# Patient Record
Sex: MALE | Race: WHITE | Employment: OTHER | URBAN - METROPOLITAN AREA
[De-identification: names, ages, dates, MRNs, and addresses within clinical notes are randomized per-mention and may not be internally consistent; named-entity substitution may affect disease eponyms.]

---

## 2017-05-12 ENCOUNTER — OFFICE VISIT (OUTPATIENT)
Dept: ORTHOPEDIC SURGERY | Age: 82
End: 2017-05-12

## 2017-05-12 VITALS — BODY MASS INDEX: 21.13 KG/M2 | HEIGHT: 72 IN | WEIGHT: 156 LBS | RESPIRATION RATE: 14 BRPM

## 2017-05-12 DIAGNOSIS — M18.12 PRIMARY OSTEOARTHRITIS OF FIRST CARPOMETACARPAL JOINT OF LEFT HAND: ICD-10-CM

## 2017-05-12 DIAGNOSIS — L03.119 CELLULITIS OF HAND: Primary | ICD-10-CM

## 2017-05-12 PROCEDURE — G8427 DOCREV CUR MEDS BY ELIG CLIN: HCPCS | Performed by: ORTHOPAEDIC SURGERY

## 2017-05-12 PROCEDURE — 4040F PNEUMOC VAC/ADMIN/RCVD: CPT | Performed by: ORTHOPAEDIC SURGERY

## 2017-05-12 PROCEDURE — G8419 CALC BMI OUT NRM PARAM NOF/U: HCPCS | Performed by: ORTHOPAEDIC SURGERY

## 2017-05-12 PROCEDURE — 1036F TOBACCO NON-USER: CPT | Performed by: ORTHOPAEDIC SURGERY

## 2017-05-12 PROCEDURE — 99213 OFFICE O/P EST LOW 20 MIN: CPT | Performed by: ORTHOPAEDIC SURGERY

## 2017-05-12 PROCEDURE — 1123F ACP DISCUSS/DSCN MKR DOCD: CPT | Performed by: ORTHOPAEDIC SURGERY

## 2017-05-12 RX ORDER — CLINDAMYCIN HYDROCHLORIDE 300 MG/1
300 CAPSULE ORAL 4 TIMES DAILY
Qty: 40 CAPSULE | Refills: 0 | Status: SHIPPED | OUTPATIENT
Start: 2017-05-12 | End: 2017-05-22

## 2017-05-12 RX ORDER — NAPROXEN 500 MG/1
500 TABLET ORAL 2 TIMES DAILY WITH MEALS
Qty: 60 TABLET | Refills: 3 | Status: SHIPPED | OUTPATIENT
Start: 2017-05-12 | End: 2019-02-25

## 2017-05-17 ENCOUNTER — OFFICE VISIT (OUTPATIENT)
Dept: ORTHOPEDIC SURGERY | Age: 82
End: 2017-05-17

## 2017-05-17 VITALS
RESPIRATION RATE: 16 BRPM | HEART RATE: 59 BPM | HEIGHT: 72 IN | SYSTOLIC BLOOD PRESSURE: 135 MMHG | WEIGHT: 156 LBS | BODY MASS INDEX: 21.13 KG/M2 | DIASTOLIC BLOOD PRESSURE: 78 MMHG

## 2017-05-17 DIAGNOSIS — M18.12 PRIMARY OSTEOARTHRITIS OF FIRST CARPOMETACARPAL JOINT OF LEFT HAND: Primary | ICD-10-CM

## 2017-05-17 PROCEDURE — 4040F PNEUMOC VAC/ADMIN/RCVD: CPT | Performed by: ORTHOPAEDIC SURGERY

## 2017-05-17 PROCEDURE — G8427 DOCREV CUR MEDS BY ELIG CLIN: HCPCS | Performed by: ORTHOPAEDIC SURGERY

## 2017-05-17 PROCEDURE — G8419 CALC BMI OUT NRM PARAM NOF/U: HCPCS | Performed by: ORTHOPAEDIC SURGERY

## 2017-05-17 PROCEDURE — 1036F TOBACCO NON-USER: CPT | Performed by: ORTHOPAEDIC SURGERY

## 2017-05-17 PROCEDURE — 99213 OFFICE O/P EST LOW 20 MIN: CPT | Performed by: ORTHOPAEDIC SURGERY

## 2017-05-17 PROCEDURE — 1123F ACP DISCUSS/DSCN MKR DOCD: CPT | Performed by: ORTHOPAEDIC SURGERY

## 2018-10-13 ENCOUNTER — HOSPITAL ENCOUNTER (EMERGENCY)
Age: 83
Discharge: HOME | End: 2018-10-13
Attending: EMERGENCY MEDICINE
Payer: MEDICARE

## 2018-10-13 ENCOUNTER — APPOINTMENT (OUTPATIENT)
Dept: CT IMAGING | Age: 83
End: 2018-10-13
Payer: MEDICARE

## 2018-10-13 VITALS
OXYGEN SATURATION: 98 % | SYSTOLIC BLOOD PRESSURE: 123 MMHG | RESPIRATION RATE: 16 BRPM | DIASTOLIC BLOOD PRESSURE: 73 MMHG | WEIGHT: 160.6 LBS | TEMPERATURE: 97.7 F | HEART RATE: 63 BPM | BODY MASS INDEX: 21.78 KG/M2

## 2018-10-13 DIAGNOSIS — M26.621 ARTHRALGIA OF RIGHT TEMPOROMANDIBULAR JOINT: ICD-10-CM

## 2018-10-13 DIAGNOSIS — H66.90 ACUTE OTITIS MEDIA, UNSPECIFIED OTITIS MEDIA TYPE: ICD-10-CM

## 2018-10-13 DIAGNOSIS — R42 VERTIGO: Primary | ICD-10-CM

## 2018-10-13 LAB
A/G RATIO: 1.5 (ref 1.1–2.2)
ALBUMIN SERPL-MCNC: 4 G/DL (ref 3.4–5)
ALP BLD-CCNC: 42 U/L (ref 40–129)
ALT SERPL-CCNC: 9 U/L (ref 10–40)
ANION GAP SERPL CALCULATED.3IONS-SCNC: 12 MMOL/L (ref 3–16)
AST SERPL-CCNC: 19 U/L (ref 15–37)
BASOPHILS ABSOLUTE: 0 K/UL (ref 0–0.2)
BASOPHILS RELATIVE PERCENT: 0.4 %
BILIRUB SERPL-MCNC: 0.6 MG/DL (ref 0–1)
BILIRUBIN URINE: NEGATIVE
BLOOD, URINE: NEGATIVE
BUN BLDV-MCNC: 19 MG/DL (ref 7–20)
CALCIUM SERPL-MCNC: 8.8 MG/DL (ref 8.3–10.6)
CHLORIDE BLD-SCNC: 95 MMOL/L (ref 99–110)
CLARITY: CLEAR
CO2: 27 MMOL/L (ref 21–32)
COLOR: YELLOW
CREAT SERPL-MCNC: 0.8 MG/DL (ref 0.8–1.3)
EOSINOPHILS ABSOLUTE: 0.2 K/UL (ref 0–0.6)
EOSINOPHILS RELATIVE PERCENT: 3.5 %
GFR AFRICAN AMERICAN: >60
GFR NON-AFRICAN AMERICAN: >60
GLOBULIN: 2.6 G/DL
GLUCOSE BLD-MCNC: 101 MG/DL (ref 70–99)
GLUCOSE URINE: NEGATIVE MG/DL
HCT VFR BLD CALC: 38.3 % (ref 40.5–52.5)
HEMOGLOBIN: 13.1 G/DL (ref 13.5–17.5)
KETONES, URINE: ABNORMAL MG/DL
LEUKOCYTE ESTERASE, URINE: NEGATIVE
LYMPHOCYTES ABSOLUTE: 1 K/UL (ref 1–5.1)
LYMPHOCYTES RELATIVE PERCENT: 15.1 %
MCH RBC QN AUTO: 30.8 PG (ref 26–34)
MCHC RBC AUTO-ENTMCNC: 34.2 G/DL (ref 31–36)
MCV RBC AUTO: 90.1 FL (ref 80–100)
MICROSCOPIC EXAMINATION: ABNORMAL
MONOCYTES ABSOLUTE: 0.5 K/UL (ref 0–1.3)
MONOCYTES RELATIVE PERCENT: 8.2 %
NEUTROPHILS ABSOLUTE: 4.8 K/UL (ref 1.7–7.7)
NEUTROPHILS RELATIVE PERCENT: 72.8 %
NITRITE, URINE: NEGATIVE
PDW BLD-RTO: 13.5 % (ref 12.4–15.4)
PH UA: 6
PLATELET # BLD: 181 K/UL (ref 135–450)
PMV BLD AUTO: 7.4 FL (ref 5–10.5)
POTASSIUM SERPL-SCNC: 3.4 MMOL/L (ref 3.5–5.1)
PROTEIN UA: NEGATIVE MG/DL
RBC # BLD: 4.26 M/UL (ref 4.2–5.9)
SODIUM BLD-SCNC: 134 MMOL/L (ref 136–145)
SPECIFIC GRAVITY UA: 1.02
TOTAL PROTEIN: 6.6 G/DL (ref 6.4–8.2)
TROPONIN: <0.01 NG/ML
URINE REFLEX TO CULTURE: ABNORMAL
URINE TYPE: ABNORMAL
UROBILINOGEN, URINE: 1 E.U./DL
WBC # BLD: 6.6 K/UL (ref 4–11)

## 2018-10-13 PROCEDURE — 80053 COMPREHEN METABOLIC PANEL: CPT

## 2018-10-13 PROCEDURE — 84484 ASSAY OF TROPONIN QUANT: CPT

## 2018-10-13 PROCEDURE — 6370000000 HC RX 637 (ALT 250 FOR IP): Performed by: NURSE PRACTITIONER

## 2018-10-13 PROCEDURE — 81003 URINALYSIS AUTO W/O SCOPE: CPT

## 2018-10-13 PROCEDURE — 99284 EMERGENCY DEPT VISIT MOD MDM: CPT

## 2018-10-13 PROCEDURE — 93005 ELECTROCARDIOGRAM TRACING: CPT | Performed by: NURSE PRACTITIONER

## 2018-10-13 PROCEDURE — 70450 CT HEAD/BRAIN W/O DYE: CPT

## 2018-10-13 PROCEDURE — 36415 COLL VENOUS BLD VENIPUNCTURE: CPT

## 2018-10-13 PROCEDURE — 85025 COMPLETE CBC W/AUTO DIFF WBC: CPT

## 2018-10-13 RX ORDER — MECLIZINE HCL 12.5 MG/1
50 TABLET ORAL ONCE
Status: COMPLETED | OUTPATIENT
Start: 2018-10-13 | End: 2018-10-13

## 2018-10-13 RX ORDER — MECLIZINE HYDROCHLORIDE 25 MG/1
25 TABLET ORAL 3 TIMES DAILY PRN
Qty: 30 TABLET | Refills: 1 | Status: SHIPPED | OUTPATIENT
Start: 2018-10-13 | End: 2018-10-23

## 2018-10-13 RX ORDER — METHYLPREDNISOLONE 4 MG/1
TABLET ORAL
Qty: 1 KIT | Refills: 0 | Status: SHIPPED | OUTPATIENT
Start: 2018-10-13 | End: 2019-02-25

## 2018-10-13 RX ADMIN — MECLIZINE 50 MG: 12.5 TABLET ORAL at 21:22

## 2018-10-13 ASSESSMENT — ENCOUNTER SYMPTOMS
ABDOMINAL PAIN: 0
COLOR CHANGE: 0
SINUS PAIN: 0
SHORTNESS OF BREATH: 0
RHINORRHEA: 0
NAUSEA: 0
VOMITING: 0

## 2018-10-14 PROCEDURE — 93010 ELECTROCARDIOGRAM REPORT: CPT | Performed by: INTERNAL MEDICINE

## 2018-10-14 NOTE — ED PROVIDER NOTES
vitals reviewed. EKG visualized interpreted by myself shows sinus rhythm. There is a first-degree AV block. The axis is to the left at -53 consistent with a left anterior fascicular block. Otherwise ST-T waves intervals unremarkable without and evidence of acute injury or ischemic pattern. 1. Vertigo    2. Acute otitis media, unspecified otitis media type    3. Arthralgia of right temporomandibular joint          DISPOSITION/PLAN  PATIENT REFERRED TO:  Harika Resendez MD  [address] [facility]   [address] [facility] [address]  [phone]    Schedule an appointment as soon as possible for a visit       [address] [facility] Emergency Department  [id number]  [phone]  Go to   As needed    DISCHARGE MEDICATIONS:  Discharge Medication List as of 10/13/2018 11:44 PM      START taking these medications    Details   meclizine (ANTIVERT) 25 MG tablet Take 1 tablet by mouth 3 times daily as needed for Dizziness, Disp-30 tablet, R-1Print      methylPREDNISolone (MEDROL, TAMMY,) 4 MG tablet By mouth., Disp-1 kit, R-0Print               MD Alysha Juan MD  10/14/18 [id number]
and nontender. Lungs CTA bilaterally. Cardiac exam is normal.  Neck supple with full range of motion. Patient has otitis media of the right ear as well as possibly some TMJ syndrome with tenderness over the right TMJ. Patient is neurovascularly intact without deficits. Patient is awake, alert & oriented x4 and speaking in complete sentences without dysphasia or slurring of their words, CN II-XII grossly intact, good coordination with normal finger-to-nose and heel-to-shin test , 5/5 motor strength in all 4 extremities, sensation to light touch intact bilaterally, patellar and achilles reflexes 2+ and equal bilaterally, gait is normal without ataxia, no truncal ataxia. Urine shows no infection or blood. No leukocytosis or anemia. No gross electrolyte abnormality other kidney or liver dysfunction. Troponin is negative. CT head normal.  EKG normal.    Emergency department course included Antivert. On my reassessment patient was standing at the side of the bed requesting be discharged. He states he had absolutely no dizziness or lightheadedness. This could've been vertigo or dehydration. This could be a secondary effect from the otitis media or TMJ. He was given a prescription for prednisone for the TMJ and Antivert for the dizziness. I feel he is appropriate and safe discharge home this time. Patient has a normal repeat neurological exam. At this time there is no indication of head injury, encephalopathy, multiple sclerosis, TIA/CVA, seizures, dehydration, hypoglycemia, mass lesions, metabolic cause, cardiac arrhythmia, PE, GI bleeding or orthostatic hypotension. Patient is stable throughout ED course and safe for outpatient follow-up. Patient made aware of treatment plan and verbally understood and agreed. Patient stable for outpatient follow-up with their family doctor in 24-48 hours.   At this time, the evidence for any other entities in the differential is insufficient to justify any further

## 2018-10-16 LAB
EKG ATRIAL RATE: 60 BPM
EKG DIAGNOSIS: NORMAL
EKG P AXIS: 76 DEGREES
EKG P-R INTERVAL: 242 MS
EKG Q-T INTERVAL: 456 MS
EKG QRS DURATION: 94 MS
EKG QTC CALCULATION (BAZETT): 456 MS
EKG R AXIS: -53 DEGREES
EKG T AXIS: -3 DEGREES
EKG VENTRICULAR RATE: 60 BPM

## 2019-02-25 RX ORDER — FINASTERIDE 5 MG/1
5 TABLET, FILM COATED ORAL DAILY
COMMUNITY
Start: 2018-12-06

## 2019-02-25 RX ORDER — PRAVASTATIN SODIUM 20 MG
20 TABLET ORAL DAILY
COMMUNITY
Start: 2018-12-31

## 2019-02-25 RX ORDER — CARVEDILOL 12.5 MG/1
12.5 TABLET ORAL 2 TIMES DAILY WITH MEALS
COMMUNITY
Start: 2019-02-11

## 2019-02-28 ENCOUNTER — ANESTHESIA EVENT (OUTPATIENT)
Dept: ENDOSCOPY | Age: 84
End: 2019-02-28
Payer: MEDICARE

## 2019-03-05 ENCOUNTER — HOSPITAL ENCOUNTER (OUTPATIENT)
Age: 84
Setting detail: OUTPATIENT SURGERY
Discharge: HOME | End: 2019-03-05
Attending: INTERNAL MEDICINE | Admitting: INTERNAL MEDICINE
Payer: MEDICARE

## 2019-03-05 ENCOUNTER — ANESTHESIA (OUTPATIENT)
Dept: ENDOSCOPY | Age: 84
End: 2019-03-05
Payer: MEDICARE

## 2019-03-05 VITALS
RESPIRATION RATE: 18 BRPM | OXYGEN SATURATION: 97 % | BODY MASS INDEX: 20.42 KG/M2 | WEIGHT: 150.8 LBS | HEART RATE: 64 BPM | DIASTOLIC BLOOD PRESSURE: 77 MMHG | TEMPERATURE: 97 F | SYSTOLIC BLOOD PRESSURE: 132 MMHG | HEIGHT: 72 IN

## 2019-03-05 VITALS — OXYGEN SATURATION: 100 % | SYSTOLIC BLOOD PRESSURE: 118 MMHG | DIASTOLIC BLOOD PRESSURE: 64 MMHG

## 2019-03-05 PROCEDURE — 3609009500 HC COLONOSCOPY DIAGNOSTIC OR SCREENING: Performed by: INTERNAL MEDICINE

## 2019-03-05 PROCEDURE — 7100000010 HC PHASE II RECOVERY - FIRST 15 MIN: Performed by: INTERNAL MEDICINE

## 2019-03-05 PROCEDURE — 7100000011 HC PHASE II RECOVERY - ADDTL 15 MIN: Performed by: INTERNAL MEDICINE

## 2019-03-05 PROCEDURE — 3700000001 HC ADD 15 MINUTES (ANESTHESIA): Performed by: INTERNAL MEDICINE

## 2019-03-05 PROCEDURE — 2580000003 HC RX 258: Performed by: ANESTHESIOLOGY

## 2019-03-05 PROCEDURE — 3700000000 HC ANESTHESIA ATTENDED CARE: Performed by: INTERNAL MEDICINE

## 2019-03-05 PROCEDURE — 2500000003 HC RX 250 WO HCPCS: Performed by: NURSE ANESTHETIST, CERTIFIED REGISTERED

## 2019-03-05 PROCEDURE — 6360000002 HC RX W HCPCS: Performed by: NURSE ANESTHETIST, CERTIFIED REGISTERED

## 2019-03-05 RX ORDER — SODIUM CHLORIDE 9 MG/ML
INJECTION, SOLUTION INTRAVENOUS CONTINUOUS
Status: DISCONTINUED | OUTPATIENT
Start: 2019-03-05 | End: 2019-03-05 | Stop reason: HOSPADM

## 2019-03-05 RX ORDER — SODIUM CHLORIDE 0.9 % (FLUSH) 0.9 %
10 SYRINGE (ML) INJECTION PRN
Status: DISCONTINUED | OUTPATIENT
Start: 2019-03-05 | End: 2019-03-05 | Stop reason: HOSPADM

## 2019-03-05 RX ORDER — LIDOCAINE HYDROCHLORIDE 20 MG/ML
INJECTION, SOLUTION EPIDURAL; INFILTRATION; INTRACAUDAL; PERINEURAL PRN
Status: DISCONTINUED | OUTPATIENT
Start: 2019-03-05 | End: 2019-03-05 | Stop reason: SDUPTHER

## 2019-03-05 RX ORDER — PROPOFOL 10 MG/ML
INJECTION, EMULSION INTRAVENOUS PRN
Status: DISCONTINUED | OUTPATIENT
Start: 2019-03-05 | End: 2019-03-05 | Stop reason: SDUPTHER

## 2019-03-05 RX ORDER — SODIUM CHLORIDE 0.9 % (FLUSH) 0.9 %
10 SYRINGE (ML) INJECTION EVERY 12 HOURS SCHEDULED
Status: DISCONTINUED | OUTPATIENT
Start: 2019-03-05 | End: 2019-03-05 | Stop reason: HOSPADM

## 2019-03-05 RX ORDER — ONDANSETRON 2 MG/ML
4 INJECTION INTRAMUSCULAR; INTRAVENOUS
Status: DISCONTINUED | OUTPATIENT
Start: 2019-03-05 | End: 2019-03-05 | Stop reason: HOSPADM

## 2019-03-05 RX ADMIN — PROPOFOL 120 MG: 10 INJECTION, EMULSION INTRAVENOUS at 10:05

## 2019-03-05 RX ADMIN — SODIUM CHLORIDE: 0.9 INJECTION, SOLUTION INTRAVENOUS at 09:41

## 2019-03-05 RX ADMIN — LIDOCAINE HYDROCHLORIDE 50 MG: 20 INJECTION, SOLUTION EPIDURAL; INFILTRATION; INTRACAUDAL; PERINEURAL at 09:55

## 2019-03-05 RX ADMIN — PROPOFOL 60 MG: 10 INJECTION, EMULSION INTRAVENOUS at 09:55

## 2019-03-05 ASSESSMENT — PAIN SCALES - WONG BAKER
WONGBAKER_NUMERICALRESPONSE: 0

## 2019-03-05 ASSESSMENT — PAIN SCALES - GENERAL
PAINLEVEL_OUTOF10: 0

## 2019-03-05 ASSESSMENT — ENCOUNTER SYMPTOMS: SHORTNESS OF BREATH: 0

## 2019-03-05 ASSESSMENT — PAIN - FUNCTIONAL ASSESSMENT: PAIN_FUNCTIONAL_ASSESSMENT: 0-10

## 2021-11-08 ENCOUNTER — APPOINTMENT (OUTPATIENT)
Dept: CT IMAGING | Age: 86
DRG: 378 | End: 2021-11-08
Payer: MEDICARE

## 2021-11-08 ENCOUNTER — HOSPITAL ENCOUNTER (INPATIENT)
Age: 86
LOS: 4 days | Discharge: HOME | DRG: 378 | End: 2021-11-12
Attending: INTERNAL MEDICINE | Admitting: INTERNAL MEDICINE
Payer: MEDICARE

## 2021-11-08 DIAGNOSIS — R10.84 GENERALIZED ABDOMINAL PAIN: ICD-10-CM

## 2021-11-08 DIAGNOSIS — K92.2 LOWER GI BLEED: Primary | ICD-10-CM

## 2021-11-08 PROBLEM — K62.5 RECTAL BLEEDING: Status: ACTIVE | Noted: 2021-11-08

## 2021-11-08 LAB
A/G RATIO: 1.4 (ref 1.1–2.2)
ABO/RH: NORMAL
ALBUMIN SERPL-MCNC: 4 G/DL (ref 3.4–5)
ALP BLD-CCNC: 53 U/L (ref 40–129)
ALT SERPL-CCNC: 9 U/L (ref 10–40)
ANION GAP SERPL CALCULATED.3IONS-SCNC: 8 MMOL/L (ref 3–16)
ANTIBODY SCREEN: NORMAL
AST SERPL-CCNC: 17 U/L (ref 15–37)
BASOPHILS ABSOLUTE: 0 K/UL (ref 0–0.2)
BASOPHILS RELATIVE PERCENT: 0.6 %
BILIRUB SERPL-MCNC: 0.5 MG/DL (ref 0–1)
BUN BLDV-MCNC: 22 MG/DL (ref 7–20)
CALCIUM SERPL-MCNC: 9.1 MG/DL (ref 8.3–10.6)
CHLORIDE BLD-SCNC: 100 MMOL/L (ref 99–110)
CO2: 27 MMOL/L (ref 21–32)
CREAT SERPL-MCNC: 0.9 MG/DL (ref 0.8–1.3)
EOSINOPHILS ABSOLUTE: 0.4 K/UL (ref 0–0.6)
EOSINOPHILS RELATIVE PERCENT: 5.8 %
GFR AFRICAN AMERICAN: >60
GFR NON-AFRICAN AMERICAN: >60
GLUCOSE BLD-MCNC: 201 MG/DL (ref 70–99)
HCT VFR BLD CALC: 40.4 % (ref 40.5–52.5)
HEMOGLOBIN: 13.2 G/DL (ref 13.5–17.5)
LACTIC ACID: 1.1 MMOL/L (ref 0.4–2)
LYMPHOCYTES ABSOLUTE: 0.9 K/UL (ref 1–5.1)
LYMPHOCYTES RELATIVE PERCENT: 12.8 %
MCH RBC QN AUTO: 30 PG (ref 26–34)
MCHC RBC AUTO-ENTMCNC: 32.8 G/DL (ref 31–36)
MCV RBC AUTO: 91.4 FL (ref 80–100)
MONOCYTES ABSOLUTE: 0.5 K/UL (ref 0–1.3)
MONOCYTES RELATIVE PERCENT: 6.9 %
NEUTROPHILS ABSOLUTE: 5 K/UL (ref 1.7–7.7)
NEUTROPHILS RELATIVE PERCENT: 73.9 %
OCCULT BLOOD DIAGNOSTIC: ABNORMAL
PDW BLD-RTO: 14.1 % (ref 12.4–15.4)
PLATELET # BLD: 202 K/UL (ref 135–450)
PMV BLD AUTO: 7.7 FL (ref 5–10.5)
POTASSIUM REFLEX MAGNESIUM: 4.5 MMOL/L (ref 3.5–5.1)
RBC # BLD: 4.42 M/UL (ref 4.2–5.9)
SODIUM BLD-SCNC: 135 MMOL/L (ref 136–145)
TOTAL PROTEIN: 6.9 G/DL (ref 6.4–8.2)
WBC # BLD: 6.8 K/UL (ref 4–11)

## 2021-11-08 PROCEDURE — 99285 EMERGENCY DEPT VISIT HI MDM: CPT

## 2021-11-08 PROCEDURE — 82270 OCCULT BLOOD FECES: CPT

## 2021-11-08 PROCEDURE — 36415 COLL VENOUS BLD VENIPUNCTURE: CPT

## 2021-11-08 PROCEDURE — 86850 RBC ANTIBODY SCREEN: CPT

## 2021-11-08 PROCEDURE — 86901 BLOOD TYPING SEROLOGIC RH(D): CPT

## 2021-11-08 PROCEDURE — 2580000003 HC RX 258: Performed by: NURSE PRACTITIONER

## 2021-11-08 PROCEDURE — 85025 COMPLETE CBC W/AUTO DIFF WBC: CPT

## 2021-11-08 PROCEDURE — 80053 COMPREHEN METABOLIC PANEL: CPT

## 2021-11-08 PROCEDURE — 86900 BLOOD TYPING SEROLOGIC ABO: CPT

## 2021-11-08 PROCEDURE — 74177 CT ABD & PELVIS W/CONTRAST: CPT

## 2021-11-08 PROCEDURE — 1200000000 HC SEMI PRIVATE

## 2021-11-08 PROCEDURE — 6360000004 HC RX CONTRAST MEDICATION: Performed by: NURSE PRACTITIONER

## 2021-11-08 PROCEDURE — 83605 ASSAY OF LACTIC ACID: CPT

## 2021-11-08 RX ORDER — 0.9 % SODIUM CHLORIDE 0.9 %
500 INTRAVENOUS SOLUTION INTRAVENOUS ONCE
Status: COMPLETED | OUTPATIENT
Start: 2021-11-08 | End: 2021-11-08

## 2021-11-08 RX ADMIN — SODIUM CHLORIDE 500 ML: 9 INJECTION, SOLUTION INTRAVENOUS at 22:50

## 2021-11-08 RX ADMIN — IOPAMIDOL 75 ML: 755 INJECTION, SOLUTION INTRAVENOUS at 22:25

## 2021-11-09 LAB
ANION GAP SERPL CALCULATED.3IONS-SCNC: 9 MMOL/L (ref 3–16)
BUN BLDV-MCNC: 18 MG/DL (ref 7–20)
CALCIUM SERPL-MCNC: 8.4 MG/DL (ref 8.3–10.6)
CHLORIDE BLD-SCNC: 103 MMOL/L (ref 99–110)
CO2: 23 MMOL/L (ref 21–32)
CREAT SERPL-MCNC: 0.7 MG/DL (ref 0.8–1.3)
GFR AFRICAN AMERICAN: >60
GFR NON-AFRICAN AMERICAN: >60
GLUCOSE BLD-MCNC: 90 MG/DL (ref 70–99)
HCT VFR BLD CALC: 34.3 % (ref 40.5–52.5)
HCT VFR BLD CALC: 35.2 % (ref 40.5–52.5)
HCT VFR BLD CALC: 35.5 % (ref 40.5–52.5)
HEMOGLOBIN: 11.5 G/DL (ref 13.5–17.5)
HEMOGLOBIN: 11.6 G/DL (ref 13.5–17.5)
HEMOGLOBIN: 11.8 G/DL (ref 13.5–17.5)
INR BLD: 1.02 (ref 0.88–1.12)
IRON SATURATION: 47 % (ref 20–50)
IRON: 99 UG/DL (ref 59–158)
MAGNESIUM: 2 MG/DL (ref 1.8–2.4)
MCH RBC QN AUTO: 30.7 PG (ref 26–34)
MCHC RBC AUTO-ENTMCNC: 33.9 G/DL (ref 31–36)
MCV RBC AUTO: 90.5 FL (ref 80–100)
PDW BLD-RTO: 14.1 % (ref 12.4–15.4)
PLATELET # BLD: 169 K/UL (ref 135–450)
PMV BLD AUTO: 7.1 FL (ref 5–10.5)
POTASSIUM REFLEX MAGNESIUM: 3.5 MMOL/L (ref 3.5–5.1)
PROTHROMBIN TIME: 11.5 SEC (ref 9.9–12.7)
RBC # BLD: 3.78 M/UL (ref 4.2–5.9)
SODIUM BLD-SCNC: 135 MMOL/L (ref 136–145)
TOTAL IRON BINDING CAPACITY: 209 UG/DL (ref 260–445)
WBC # BLD: 6.2 K/UL (ref 4–11)

## 2021-11-09 PROCEDURE — 85027 COMPLETE CBC AUTOMATED: CPT

## 2021-11-09 PROCEDURE — 94760 N-INVAS EAR/PLS OXIMETRY 1: CPT

## 2021-11-09 PROCEDURE — 83735 ASSAY OF MAGNESIUM: CPT

## 2021-11-09 PROCEDURE — 1200000000 HC SEMI PRIVATE

## 2021-11-09 PROCEDURE — 36415 COLL VENOUS BLD VENIPUNCTURE: CPT

## 2021-11-09 PROCEDURE — 2580000003 HC RX 258: Performed by: NURSE PRACTITIONER

## 2021-11-09 PROCEDURE — U0005 INFEC AGEN DETEC AMPLI PROBE: HCPCS

## 2021-11-09 PROCEDURE — U0003 INFECTIOUS AGENT DETECTION BY NUCLEIC ACID (DNA OR RNA); SEVERE ACUTE RESPIRATORY SYNDROME CORONAVIRUS 2 (SARS-COV-2) (CORONAVIRUS DISEASE [COVID-19]), AMPLIFIED PROBE TECHNIQUE, MAKING USE OF HIGH THROUGHPUT TECHNOLOGIES AS DESCRIBED BY CMS-2020-01-R: HCPCS

## 2021-11-09 PROCEDURE — 85610 PROTHROMBIN TIME: CPT

## 2021-11-09 PROCEDURE — 6370000000 HC RX 637 (ALT 250 FOR IP): Performed by: NURSE PRACTITIONER

## 2021-11-09 PROCEDURE — 85014 HEMATOCRIT: CPT

## 2021-11-09 PROCEDURE — 83540 ASSAY OF IRON: CPT

## 2021-11-09 PROCEDURE — 85018 HEMOGLOBIN: CPT

## 2021-11-09 PROCEDURE — 80048 BASIC METABOLIC PNL TOTAL CA: CPT

## 2021-11-09 PROCEDURE — 83550 IRON BINDING TEST: CPT

## 2021-11-09 RX ORDER — SODIUM CHLORIDE 0.9 % (FLUSH) 0.9 %
5-40 SYRINGE (ML) INJECTION EVERY 12 HOURS SCHEDULED
Status: DISCONTINUED | OUTPATIENT
Start: 2021-11-09 | End: 2021-11-12 | Stop reason: HOSPADM

## 2021-11-09 RX ORDER — FLUTICASONE PROPIONATE 50 MCG
2 SPRAY, SUSPENSION (ML) NASAL DAILY PRN
COMMUNITY
Start: 2021-09-29

## 2021-11-09 RX ORDER — SODIUM CHLORIDE 9 MG/ML
25 INJECTION, SOLUTION INTRAVENOUS PRN
Status: DISCONTINUED | OUTPATIENT
Start: 2021-11-09 | End: 2021-11-12 | Stop reason: HOSPADM

## 2021-11-09 RX ORDER — ONDANSETRON 4 MG/1
4 TABLET, ORALLY DISINTEGRATING ORAL EVERY 8 HOURS PRN
Status: DISCONTINUED | OUTPATIENT
Start: 2021-11-09 | End: 2021-11-12 | Stop reason: HOSPADM

## 2021-11-09 RX ORDER — FINASTERIDE 5 MG/1
5 TABLET, FILM COATED ORAL DAILY
Status: DISCONTINUED | OUTPATIENT
Start: 2021-11-09 | End: 2021-11-12 | Stop reason: HOSPADM

## 2021-11-09 RX ORDER — ACETAMINOPHEN 325 MG/1
650 TABLET ORAL EVERY 6 HOURS PRN
Status: DISCONTINUED | OUTPATIENT
Start: 2021-11-09 | End: 2021-11-12 | Stop reason: HOSPADM

## 2021-11-09 RX ORDER — PRAVASTATIN SODIUM 20 MG
20 TABLET ORAL NIGHTLY
Status: DISCONTINUED | OUTPATIENT
Start: 2021-11-09 | End: 2021-11-12 | Stop reason: HOSPADM

## 2021-11-09 RX ORDER — SODIUM CHLORIDE 0.9 % (FLUSH) 0.9 %
5-40 SYRINGE (ML) INJECTION PRN
Status: DISCONTINUED | OUTPATIENT
Start: 2021-11-09 | End: 2021-11-12 | Stop reason: HOSPADM

## 2021-11-09 RX ORDER — CARVEDILOL 12.5 MG/1
12.5 TABLET ORAL 2 TIMES DAILY WITH MEALS
Status: DISCONTINUED | OUTPATIENT
Start: 2021-11-09 | End: 2021-11-12 | Stop reason: HOSPADM

## 2021-11-09 RX ORDER — ACETAMINOPHEN 650 MG/1
650 SUPPOSITORY RECTAL EVERY 6 HOURS PRN
Status: DISCONTINUED | OUTPATIENT
Start: 2021-11-09 | End: 2021-11-12 | Stop reason: HOSPADM

## 2021-11-09 RX ORDER — ONDANSETRON 2 MG/ML
4 INJECTION INTRAMUSCULAR; INTRAVENOUS EVERY 6 HOURS PRN
Status: DISCONTINUED | OUTPATIENT
Start: 2021-11-09 | End: 2021-11-12 | Stop reason: HOSPADM

## 2021-11-09 RX ADMIN — PRAVASTATIN SODIUM 20 MG: 20 TABLET ORAL at 20:26

## 2021-11-09 RX ADMIN — SODIUM CHLORIDE, PRESERVATIVE FREE 10 ML: 5 INJECTION INTRAVENOUS at 08:22

## 2021-11-09 RX ADMIN — FINASTERIDE 5 MG: 5 TABLET, FILM COATED ORAL at 08:19

## 2021-11-09 RX ADMIN — SODIUM CHLORIDE, PRESERVATIVE FREE 10 ML: 5 INJECTION INTRAVENOUS at 20:27

## 2021-11-09 RX ADMIN — CARVEDILOL 12.5 MG: 12.5 TABLET, FILM COATED ORAL at 16:53

## 2021-11-09 RX ADMIN — CARVEDILOL 12.5 MG: 12.5 TABLET, FILM COATED ORAL at 08:19

## 2021-11-09 ASSESSMENT — PAIN SCALES - GENERAL
PAINLEVEL_OUTOF10: 0

## 2021-11-09 NOTE — CARE COORDINATION
INITIAL CASE MANAGEMENT ASSESSMENT    Reviewed chart, met with patient to assess possible discharge needs. Explained Case Management role/services. The SW Student talked to the patient's daughter on the behalf of the patient     Living Situation: The patient's daughter confirmed address. The patient lives in an house with his spouse. He has no pets and he 6 steps from the garage or use five steps to the back door     ADLs: independent      DME: the patient has no durable medical equipment at home. PT/OT Recs: not ordered      Active Services: The patient does not have an active services at this time. Transportation: The patient is an active  and the patient's daughter will be picking patient up at discharge. Medications: The patient gets his medications from [facility] pharmacy on [address] He has no barriers with getting medications     PCP: Josr Smith [phone](EOQBT number) and [phone](HZY number). The patient last saw his PCP on two weeks ago. HD/PD: n/a    PLAN/COMMENTS: Monitor GI clearance       SW/CM provided contact information for patient or family to call with any questions. SW/CM will follow and assist as needed.     Ángela Loco (MSW intern)  [facility] [place]  Phone [phone]  Fax [phone]  Electronically signed by Donna Koehler on 11/9/2021 at 12:07 PM

## 2021-11-09 NOTE — CONSULTS
[facility]   GI Consult Note      Reason for Consult:  Lower GI bleeding  Requesting Physician:  Jay    CHIEF COMPLAINT:  Rectal bleeding    History Obtained From:  patient    HISTORY OF PRESENT ILLNESS:                The patient is a 80 y.o. male with significant past medical history of diverticulosis. He is admitted with rectal bleeding. CT shows diverticulosis. His blood count is stable. Past Medical History:        Diagnosis Date    Arthritis     Hyperlipidemia     Hypertension     Prostate troubles      Past Surgical History:        Procedure Laterality Date    APPENDECTOMY      COLONOSCOPY N/A 3/5/2019    COLONOSCOPY DIAGNOSTIC performed by Crow Portillo MD at [address] ([place] [address])       Current Medications:    Current Facility-Administered Medications: carvedilol (COREG) tablet 12.5 mg, 12.5 mg, Oral, BID WC  finasteride (PROSCAR) tablet 5 mg, 5 mg, Oral, Daily  pravastatin (PRAVACHOL) tablet 20 mg, 20 mg, Oral, Nightly  sodium chloride flush 0.9 % injection 5-40 mL, 5-40 mL, IntraVENous, 2 times per day  sodium chloride flush 0.9 % injection 5-40 mL, 5-40 mL, IntraVENous, PRN  0.9 % sodium chloride infusion, 25 mL, IntraVENous, PRN  ondansetron (ZOFRAN-ODT) disintegrating tablet 4 mg, 4 mg, Oral, Q8H PRN **OR** ondansetron (ZOFRAN) injection 4 mg, 4 mg, IntraVENous, Q6H PRN  acetaminophen (TYLENOL) tablet 650 mg, 650 mg, Oral, Q6H PRN **OR** acetaminophen (TYLENOL) suppository 650 mg, 650 mg, Rectal, Q6H PRN  Allergies:  Penicillins    Social History:    Social History     Socioeconomic History    Marital status:      Spouse name: Not on file    Number of children: Not on file    Years of education: Not on file    Highest education level: Not on file   Occupational History    Not on file   Tobacco Use    Smoking status: Former Smoker    Smokeless tobacco: Never Used    Tobacco comment: smoked for 10 years    Substance and Sexual Activity    Alcohol use:  No  Drug use: No    Sexual activity: Not on file   Other Topics Concern    Not on file   Social History Narrative    Not on file     Social Determinants of Health     Financial Resource Strain:     Difficulty of Paying Living Expenses: Not on file   Food Insecurity:     Worried About Running Out of Food in the Last Year: Not on file    Debra of Food in the Last Year: Not on file   Transportation Needs:     Lack of Transportation (Medical): Not on file    Lack of Transportation (Non-Medical): Not on file   Physical Activity:     Days of Exercise per Week: Not on file    Minutes of Exercise per Session: Not on file   Stress:     Feeling of Stress : Not on file   Social Connections:     Frequency of Communication with Friends and Family: Not on file    Frequency of Social Gatherings with Friends and Family: Not on file    Attends Hindu Services: Not on file    Active Member of [address] [place] [address] [employer] or Organizations: Not on file    Attends Club or Organization Meetings: Not on file    Marital Status: Not on file   Intimate Partner Violence:     Fear of Current or Ex-Partner: Not on file    Emotionally Abused: Not on file    Physically Abused: Not on file    Sexually Abused: Not on file   Housing Stability:     Unable to Pay for Housing in the Last Year: Not on file    Number of [place] in the Last Year: Not on file    Unstable Housing in the Last Year: Not on file       Family History:   History reviewed. No pertinent family history.   REVIEW OF SYSTEMS:    CONSTITUTIONAL:  negative  EYES:  negative  HEENT:  negative  RESPIRATORY:  negative  CARDIOVASCULAR:  negative  GASTROINTESTINAL:  negative  INTEGUMENT/BREAST:  negative  HEMATOLOGIC/LYMPHATIC:  negative  ENDOCRINE:  negative  MUSCULOSKELETAL:  negative  NEUROLOGICAL:  negative  PHYSICAL EXAM:    General Appearance: alert and oriented to person, place and time, well developed and well- nourished, in no acute distress  Skin: warm and dry, no rash or erythema  Head: normocephalic and atraumatic  Eyes: pupils equal, round, and reactive to light, extraocular eye movements intact, conjunctivae normal  ENT: tympanic membrane, external ear and ear canal normal bilaterally, nose without deformity, nasal mucosa and turbinates normal without polyps  Neck: supple and non-tender without mass, no thyromegaly or thyroid nodules, no cervical lymphadenopathy  Pulmonary/Chest: clear to auscultation bilaterally- no wheezes, rales or rhonchi, normal air movement, no respiratory distress  Cardiovascular: normal rate, regular rhythm, normal S1 and S2, no murmurs, rubs, clicks, or gallops, distal pulses intact, no carotid bruits  Abdomen: soft, non-tender, non-distended, normal bowel sounds, no masses or organomegaly  Extremities: no cyanosis, clubbing or edema  Musculoskeletal: normal range of motion, no joint swelling, deformity or tenderness  Neurologic: reflexes normal and symmetric, no cranial nerve deficit, gait, coordination and speech normal  Vitals:    BP (!) 166/78   Pulse 82   Temp 98.1 °F (36.7 °C) (Oral)   Resp 16   Ht 6' (1.829 m)   Wt 148 lb 5.9 oz (67.3 kg)   SpO2 93%   BMI 20.12 kg/m²     DATA:    CBC with Differential:    Lab Results   Component Value Date    WBC 6.2 11/09/2021    RBC 3.78 11/09/2021    HGB 11.8 11/09/2021    HCT 35.5 11/09/2021     11/09/2021    MCV 90.5 11/09/2021    MCH 30.7 11/09/2021    MCHC 33.9 11/09/2021    RDW 14.1 11/09/2021    LYMPHOPCT 12.8 11/08/2021    MONOPCT 6.9 11/08/2021    BASOPCT 0.6 11/08/2021    MONOSABS 0.5 11/08/2021    LYMPHSABS 0.9 11/08/2021    EOSABS 0.4 11/08/2021    BASOSABS 0.0 11/08/2021     CMP:    Lab Results   Component Value Date     11/09/2021    K 3.5 11/09/2021     11/09/2021    CO2 23 11/09/2021    BUN 18 11/09/2021    CREATININE 0.7 11/09/2021    GFRAA >60 11/09/2021    AGRATIO 1.4 11/08/2021    LABGLOM >60 11/09/2021    GLUCOSE 90 11/09/2021    PROT 6.9 11/08/2021    LABALBU 4.0 11/08/2021    CALCIUM 8.4 11/09/2021    BILITOT 0.5 11/08/2021    ALKPHOS 53 11/08/2021    AST 17 11/08/2021    ALT 9 11/08/2021       IMPRESSION/RECOMMENDATIONS:      1. Probable diverticular bleeding. Will prep for colonoscopy on Thursday.     Electronically signed by Ben Naik MD on 11/9/2021 at 1:02 PM

## 2021-11-09 NOTE — PROGRESS NOTES
Medication Reconciliation    List of medications patient is currently taking is complete.     Source of information: 1. Conversation with patient at bedside                                      2. EPIC records      Allergies  Penicillins     Notes regarding home medications:   1. Patient states he takes Pravastatin 20 mg every other night.   2. Uses Flonase only prn    Dangelo Wynne, PharmD Candidate [phone]

## 2021-11-09 NOTE — ACP (ADVANCE CARE PLANNING)
Advance Care Planning     Advance Care Planning Activator (Inpatient)  Conversation Note      Date of ACP Conversation: 11/9/2021     Conversation Conducted with: Patient with Zain 51: Next of Kin by law (only applies in absence of above) (name) Reshma Osullivan     ACP Activator: HERNANDO [address] Decision Maker:     Current Designated Health Care Decision Maker:     Primary Decision Maker: Isaac Reyestiff - Spouse - [phone]    Secondary Decision Maker: Sandra Hernandez - Child - [phone]      Care Preferences    Ventilation: \"If you were in your present state of health and suddenly became very ill and were unable to breathe on your own, what would your preference be about the use of a ventilator (breathing machine) if it were available to you? \"      Would the patient desire the use of ventilator (breathing machine)?: yes    \"If your health worsens and it becomes clear that your chance of recovery is unlikely, what would your preference be about the use of a ventilator (breathing machine) if it were available to you? \"     Would the patient desire the use of ventilator (breathing machine)?: UNSURE      Resuscitation  \"CPR works best to restart the heart when there is a sudden event, like a heart attack, in someone who is otherwise healthy. Unfortunately, CPR does not typically restart the heart for people who have serious health conditions or who are very sick. \"    \"In the event your heart stopped as a result of an underlying serious health condition, would you want attempts to be made to restart your heart (answer \"yes\" for attempt to resuscitate) or would you prefer a natural death (answer \"no\" for do not attempt to resuscitate)? \" yes       [] Yes   [] No   Educated Patient / Lawrnce Mater regarding differences between Advance Directives and portable DNR orders.     Length of ACP Conversation in minutes:  6    Conversation Outcomes:  [x] ACP discussion completed  [] Existing advance directive reviewed with patient; no changes to patient's previously recorded wishes  [] New Advance Directive completed  [] Portable Do Not Rescitate prepared for Provider review and signature  [] POLST/POST/MOLST/MOST prepared for Provider review and signature      Follow-up plan:    [] Schedule follow-up conversation to continue planning  [] Referred individual to Provider for additional questions/concerns   [] Advised patient/agent/surrogate to review completed ACP document and update if needed with changes in condition, patient preferences or care setting    [] This note routed to one or more involved healthcare providers    Anabela Solano (MSW intern)  [facility] AT [place]  Phone [phone]  Fax [phone]  Electronically signed by Wan Bryant on 11/9/2021 at 11:59 AM

## 2021-11-09 NOTE — PROGRESS NOTES
Patient arrived to 5 from ED via stretcher. Pt is A/O x 4 but is hard of hearing, VSS and denies any c/o pain. Skin assessment completed. Please see 4 eyes skin note and skin documentation. Bed locked in lowest position, non-skid socks are on and call light/belongings are within reach. Pt instructed to use call light to call out for assistance as needed. Pt oriented to room, call light and hourly rounding. Will review orders and continue to monitor.      Maribell Tellez RN 11/9/2021 12:51 AM

## 2021-11-09 NOTE — PROGRESS NOTES
Pt had large loose stool, dark brown and red in color. Pt states it looks much less red than this morning. Will continue to monitor.

## 2021-11-09 NOTE — H&P
Hospital Medicine History & Physical      PCP: Andrew Waterman MD    Date of Admission: 11/8/2021    Date of Service: Pt seen/examined on 11/8/2021 and Admitted to Inpatient with expected LOS greater than two midnights due to medical therapy. Chief Complaint:  Bright red blood per rectum      History Of Present Illness:      80 y.o. male with PMHx of HLD, HTN and enlarged prostate presented to [facility] with bloody stool. Patient reports 5-6 bloody stools today beginning at 7 AM.  He notes bright red blood in the commode. This is mixed with some stool. He denies abdominal pain. No nausea or vomiting. No dizziness or lightheadedness. No fever, chills or body aches. No prior history of GI bleeding. Last colonoscopy 3 years ago. Past Medical History:          Diagnosis Date    Arthritis     Hyperlipidemia     Hypertension     Prostate troubles        Past Surgical History:          Procedure Laterality Date    APPENDECTOMY      COLONOSCOPY N/A 3/5/2019    COLONOSCOPY DIAGNOSTIC performed by Rhonda Borden MD at [address] ([place] [address])         Medications Prior to Admission:      Prior to Admission medications    Medication Sig Start Date End Date Taking? Authorizing Provider   carvedilol (COREG) 12.5 MG tablet Take 12.5 mg by mouth 2 times daily (with meals)  2/11/19   Historical Provider, MD   finasteride (PROSCAR) 5 MG tablet Take 5 mg by mouth daily  12/6/18   Historical Provider, MD   pravastatin (PRAVACHOL) 20 MG tablet Take 20 mg by mouth daily  12/31/18   Historical Provider, MD       Allergies:  Penicillins    Social History:        TOBACCO:   reports that he has quit smoking. He has never used smokeless tobacco.  ETOH:   reports no history of alcohol use. Family History:      Reviewed in detail positive as follows:    History reviewed. No pertinent family history. REVIEW OF SYSTEMS:   Pertinent positives as noted in the HPI.  All other systems reviewed and negative. PHYSICAL EXAM PERFORMED:    BP (!) 157/85   Pulse 90   Temp 97.5 °F (36.4 °C) (Oral)   Resp 16   Ht 6' (1.829 m)   Wt 148 lb 2.4 oz (67.2 kg)   SpO2 97%   BMI 20.09 kg/m²     General appearance:  Well developed, well nourished, elderly  male lying on ED cart in no apparent distress, appears stated age and cooperative. HEENT:  Normal cephalic, atraumatic without obvious deformity. Pupils equal, round, and reactive to light. Conjunctivae/corneas clear. Neck: Supple, with full range of motion. No jugular venous distention. Trachea midline. Respiratory:  Normal respiratory effort. Clear to auscultation, bilaterally without accessory muscle use. Cardiovascular:  Regular rate and rhythm without murmurs, no lower extremity edema. Abdomen: Soft, non-tender, non-distended, without rebound or guarding. Normal bowel sounds. Musculoskeletal:  Moves all extremities equally. Full range of motion without deformity. Skin: Skin warm, dry and intact. No rashes or lesions. Neurologic:  Neurovascularly intact without any focal sensory/motor deficits. Cranial nerves: II-XII intact, grossly non-focal.  Psychiatric:  Alert and oriented, thought content appropriate, normal insight  Capillary Refill: Brisk,< 3 seconds   Peripheral Pulses: +2 palpable, equal bilaterally       Labs:     Recent Labs     11/08/21 1958   WBC 6.8   HGB 13.2*   HCT 40.4*        Recent Labs     11/08/21 1958   *   K 4.5      CO2 27   BUN 22*   CREATININE 0.9   CALCIUM 9.1     Recent Labs     11/08/21 1958   AST 17   ALT 9*   BILITOT 0.5   ALKPHOS 53     No results for input(s): INR in the last 72 hours. No results for input(s): Adonica Hoose in the last 72 hours.     Urinalysis:      Lab Results   Component Value Date    NITRU Negative 10/13/2018    BLOODU Negative 10/13/2018    SPECGRAV 1.018 10/13/2018    GLUCOSEU Negative 10/13/2018       Radiology:       CT ABDOMEN PELVIS W IV CONTRAST Additional Contrast? None   Final Result   1. Diverticulosis without scan evidence for diverticulitis. 2. Cholelithiasis without scan evidence of acute cholecystitis. 3. T12 compression fracture appears to be old. It is not present on chest   radiographs from 03/02/2010. ASSESSMENT:    Active Hospital Problems    Diagnosis Date Noted    Rectal bleeding [K62.5] 11/08/2021         PLAN:      Rectal Bleeding - Bright red bleeding  - Pt's stool positive for Occult blood  - Monitor serial Hemoglobin and Hematocrit values every 6 hours  - transfuse as necessary to maintain a Hemoglobin > 7   - Gastroenterology has been consulted  - NPO after midnight to facilitate procedures if deemed necessary after Gastroenterology evaluation. Essential (primary) hypertension   - monitor blood pressure  - continue home meds     Hyperlipidemia   - continue statin    DVT Prophylaxis: SCD  Diet: ADULT DIET; Clear Liquid  Code Status: Full Code    Dispo - Inpatient       [address], APRN - CNP    Thank you Hakeem Rutledge MD for the opportunity to be involved in this patient's care. If you have any questions or concerns please feel free to contact me at [phone].

## 2021-11-09 NOTE — ED PROVIDER NOTES
[address]  [address] [address]  Dept: [phone]  Loc: [address] ENCOUNTER        This patient was not seen or evaluated by the attending physician. I evaluated this patient, the attending physician was available for consultation. CHIEF COMPLAINT    Chief Complaint   Patient presents with    Rectal Bleeding     5 episodes of bloody stools says prune juice keeps him regular states no hx of hemorroids. states the blood is bright red        HPI    Meg Cobos is a 80 y.o. male who presents with complaint of bright red blood per rectum. Onset was this morning. The duration was about 3-5 episode. The context was he states that he woke up this morning, went to go the bathroom like normal and he noticed blood filling up the toilet. He has had a few episodes of bright red blood per rectum that he states fills up the entire toilet since then. He states that he is not passing any large clots, no melena, hematemesis. Does describe some mild abdominal cramping. No gross hematuria. There are no aggravating or alleviating factors. The patient is not currently on anticoagulants. Has seen Dr. Sharon Rhodes before in the past for colonoscopy. Came back to the ED for further evaluation and treatment. REVIEW OF SYSTEMS    GI: see HPI, +hematochezia  Cardiac: No chest pain or syncope  Pulmonary: No difficulty breathing, no cough  General: No fevers   : No hematuria or dysuria  See HPI for further details. All other systems reviewed and are negative.     PAST MEDICAL & SURGICAL HISTORY    Past Medical History:   Diagnosis Date    Arthritis     Hyperlipidemia     Hypertension     Prostate troubles      Past Surgical History:   Procedure Laterality Date    APPENDECTOMY      COLONOSCOPY N/A 3/5/2019    COLONOSCOPY DIAGNOSTIC performed by Yeni Castillo MD at [address] ([place] [address]) CURRENT MEDICATIONS    Current Outpatient Rx   Medication Sig Dispense Refill    carvedilol (COREG) 12.5 MG tablet       finasteride (PROSCAR) 5 MG tablet       pravastatin (PRAVACHOL) 20 MG tablet          ALLERGIES    Allergies   Allergen Reactions    Penicillins        SOCIAL AND FAMILY HISTORY    Social History     Socioeconomic History    Marital status:      Spouse name: None    Number of children: None    Years of education: None    Highest education level: None   Occupational History    None   Tobacco Use    Smoking status: Former Smoker    Smokeless tobacco: Never Used    Tobacco comment: smoked for 10 years    Substance and Sexual Activity    Alcohol use: No    Drug use: No    Sexual activity: None   Other Topics Concern    None   Social History Narrative    None     Social Determinants of Health     Financial Resource Strain:     Difficulty of Paying Living Expenses: Not on file   Food Insecurity:     Worried About Running Out of Food in the Last Year: Not on file    Debra of Food in the Last Year: Not on file   Transportation Needs:     Lack of Transportation (Medical): Not on file    Lack of Transportation (Non-Medical):  Not on file   Physical Activity:     Days of Exercise per Week: Not on file    Minutes of Exercise per Session: Not on file   Stress:     Feeling of Stress : Not on file   Social Connections:     Frequency of Communication with Friends and Family: Not on file    Frequency of Social Gatherings with Friends and Family: Not on file    Attends Muslim Services: Not on file    Active Member of Clubs or Organizations: Not on file    Attends Club or Organization Meetings: Not on file    Marital Status: Not on file   Intimate Partner Violence:     Fear of Current or Ex-Partner: Not on file    Emotionally Abused: Not on file    Physically Abused: Not on file    Sexually Abused: Not on file   Housing Stability:     Unable to Pay for Housing in the Last Year: Not on file    Number of Places Lived in the Last Year: Not on file    Unstable Housing in the Last Year: Not on file     History reviewed. No pertinent family history. PHYSICAL EXAM    VITAL SIGNS: BP (!) 179/87   Pulse 83   Temp 97.3 °F (36.3 °C) (Tympanic)   Resp 16   Ht 6' (1.829 m)   Wt 148 lb 5.9 oz (67.3 kg)   SpO2 95%   BMI 20.12 kg/m²   Constitutional:  Well developed, well nourished  Eyes:  Sclera nonicteric, conjunctiva pale  HENT:  Atraumatic, nose normal, airway patent  Neck: Supple, no JVD  Respiratory:  Lungs clear to auscultation bilaterally, no retractions, no accessory muscle use  Cardiovascular:  regular rate, normal rhythm, no murmurs  GI:  no abdominal tenderness to palpation, no guarding, bowel sounds present  Rectal: Exam with chaperone reveals: bloody stool present in the rectal vault,   + gross blood, guaiac POSITIVE; no complicated hemorrhoids, fissures, fistulae or abscesses visible or palpable  Musculoskeletal:  No edema, no acute deformity  Integument: No rash, dry skin  Neurologic:  Alert & oriented, no slurred speech  Psychiatric: Cooperative, pleasant affect     RADIOLOGY/PROCEDURES    CT ABDOMEN PELVIS W IV CONTRAST Additional Contrast? None   Final Result   1. Diverticulosis without scan evidence for diverticulitis. 2. Cholelithiasis without scan evidence of acute cholecystitis. 3. T12 compression fracture appears to be old. It is not present on chest   radiographs from 03/02/2010. ED COURSE & MEDICAL DECISION MAKING    Pertinent Labs & Imaging studies reviewed and interpreted. (See chart for details)   See chart for details of medications given during the ED stay.     Vitals:    11/08/21 1947 11/08/21 2151   BP: (!) 183/91 (!) 179/87   Pulse: 84 83   Resp:  16   Temp: 97.3 °F (36.3 °C)    TempSrc: Tympanic    SpO2: 97% 95%   Weight: 148 lb 5.9 oz (67.3 kg)    Height: 6' (1.829 m)        Differential diagnosis: Hemorrhagic Shock, Coagulopathy, Platelet Dysfunction or Thrombocytopenia, Ischemic Bowel, Arterial Venous Malformation of the colon, unstable bleeding from Diverticulosis, Colon Cancer/other GI cancer, upper GI bleed, other. CRITICAL CARE NOTE:  There was a high probability of clinically significant life-threatening deterioration of the patient's condition requiring my urgent intervention. Total critical care time was at least 5 minutes. This includes vital sign monitoring, pulse oximetry monitoring, telemetry monitoring, clinical response to the IV medications, reviewing the nursing notes, consultation time, dictation/documentation time, and interpretation of the labwork. This excludes any separately billable procedures performed. Patient is afebrile and nontoxic in appearance. No abdominal tenderness on exam.  +bright red blood present in the rectal vault. Labs reveal hemoglobin of 13.2, hct 40.4. BUN 22, creat 0.9. Fecal occult stool is positive. CT is read by the radiologist as above    Consultation with hospitalist: I contacted Dr. Esperanza Chavez via [facility], and the patient was accepted for admission. FINAL IMPRESSION    1. Lower GI bleed    2.  Generalized abdominal pain        PLAN  Admission     (Please note that this note was completed with a voice recognition program.  Every attempt was made to edit the dictations, but inevitably there remain words that are mis-transcribed.)      Claudio Cline, GINA - CNP  11/08/21 6621

## 2021-11-09 NOTE — ED NOTES
Report called to 3 W RN to assume care, all questions answered. Pt is alert and oriented x4. No signs of acute distress noted. Iv normal saline locked. Pt to room 4104 via stretcher with ED tech. PT and family updated on plan of care.       Jeannie Rajan RN  11/09/21 [address] Trevon Parikh RN  11/09/21 4037

## 2021-11-09 NOTE — PROGRESS NOTES
4 Eyes Skin Assessment     NAME:  Flako Peña  YOB: 1931  MEDICAL RECORD NUMBER:  [id number]    The patient is being assess for  Admission    I agree that 2 RN's have performed a thorough Head to Toe Skin Assessment on the patient. ALL assessment sites listed below have been assessed. Areas assessed by both nurses:    Head, Face, Ears, Shoulders, Back, Chest, Arms, Elbows, Hands, Sacrum. Buttock, Coccyx, Ischium and Legs. Feet and Heels        Does the Patient have a Wound?  No noted wound(s)       Matthew Prevention initiated:  No   Wound Care Orders initiated:  No    Pressure Injury (Stage 3,4, Unstageable, DTI, NWPT, and Complex wounds) if present place consult order under [de-identified] No    New and Established Ostomies if present place consult order under : No      Nurse 1 eSignature: Electronically signed by Cha Stacy RN on 11/9/21 at 12:51 AM EST    **SHARE this note so that the co-signing nurse is able to place an eSignature**    Nurse 2 eSignature: Electronically signed by Ulysses Deans, RN on 11/9/21 at 5:47 AM EST

## 2021-11-09 NOTE — PROGRESS NOTES
Hospitalist Progress Note      PCP: Rigo Pulliam MD    Date of Admission: 11/8/2021        Subjective:     Doing well. .. No rectal bleeding since admission, has not had a bowel movement since. . No abdominal pain, nausea vomiting      Medications:  Reviewed    Infusion Medications    sodium chloride       Scheduled Medications    carvedilol  12.5 mg Oral BID WC    finasteride  5 mg Oral Daily    pravastatin  20 mg Oral Nightly    sodium chloride flush  5-40 mL IntraVENous 2 times per day     PRN Meds: sodium chloride flush, sodium chloride, ondansetron **OR** ondansetron, acetaminophen **OR** acetaminophen      Intake/Output Summary (Last 24 hours) at 11/9/2021 0923  Last data filed at 11/9/2021 0541  Gross per 24 hour   Intake 500 ml   Output 400 ml   Net 100 ml       Physical Exam Performed:    BP (!) 166/78   Pulse 82   Temp 98.1 °F (36.7 °C) (Oral)   Resp 16   Ht 6' (1.829 m)   Wt 148 lb 5.9 oz (67.3 kg)   SpO2 93%   BMI 20.12 kg/m²     General appearance: No apparent distress, appears stated age and cooperative. HEENT: Pupils equal, round, and reactive to light. Conjunctivae/corneas clear. Neck: Supple, with full range of motion. No jugular venous distention. Trachea midline. Respiratory:  Normal respiratory effort. Clear to auscultation, bilaterally without Rales/Wheezes/Rhonchi. Cardiovascular: Regular rate and rhythm with normal S1/S2 without murmurs, rubs or gallops. Abdomen: Soft, non-tender, non-distended with normal bowel sounds. Musculoskeletal: No clubbing, cyanosis or edema bilaterally. Full range of motion without deformity. Skin: Skin color, texture, turgor normal.  No rashes or lesions. Neurologic:  Neurovascularly intact without any focal sensory/motor deficits.  Cranial nerves: II-XII intact, grossly non-focal.  Psychiatric: Alert and oriented, thought content appropriate, normal insight  Capillary Refill: Brisk,3 seconds, normal   Peripheral Pulses: +2 palpable, equal bilaterally       Labs:   Recent Labs     11/08/21 1958 11/09/21  0538   WBC 6.8 6.2   HGB 13.2* 11.6*   HCT 40.4* 34.3*    169     Recent Labs     11/08/21 1958 11/09/21  0538   * 135*   K 4.5 3.5    103   CO2 27 23   BUN 22* 18   CREATININE 0.9 0.7*   CALCIUM 9.1 8.4     Recent Labs     11/08/21 1958   AST 17   ALT 9*   BILITOT 0.5   ALKPHOS 53     Recent Labs     11/09/21  0538   INR 1.02     No results for input(s): Erica Stuart in the last 72 hours. Urinalysis:      Lab Results   Component Value Date    NITRU Negative 10/13/2018    BLOODU Negative 10/13/2018    SPECGRAV 1.018 10/13/2018    GLUCOSEU Negative 10/13/2018       Radiology:  CT ABDOMEN PELVIS W IV CONTRAST Additional Contrast? None   Final Result   1. Diverticulosis without scan evidence for diverticulitis. 2. Cholelithiasis without scan evidence of acute cholecystitis. 3. T12 compression fracture appears to be old. It is not present on chest   radiographs from 03/02/2010. Assessment/Plan:    -Painless rectal bleeding likely diverticular bleed. BRBPR x 1 day. ... , not requiring transfusion. . No episodes since admission . await GI eval.. Continue to monitor H&H, last colonoscopy apparently done 3 years ago was normal per patient. . CT showed evidence of diverticulosis    -Mild acute blood loss anemia-H&H dropped from 13.3-11. 6-continue to monitor H&H--no need for transfusion at the moment    -Essential hypertension, uncontrolled--continue Coreg for now but hold active bleeding at present    -Old T12 compression fracture--asymptomatic--incidental finding on CT abdomen--continue to monitor    -Asymptomatic cholelithiasis--incidental finding on CT    -Hyperlipidemia-continue statin    -BPH-continue finasteride      DVT Prophylaxis: scd  Diet: ADULT DIET;  Clear Liquid  Code Status: Full Code        Raman Barragan MD

## 2021-11-10 ENCOUNTER — ANESTHESIA EVENT (OUTPATIENT)
Dept: ENDOSCOPY | Age: 86
DRG: 378 | End: 2021-11-10
Payer: MEDICARE

## 2021-11-10 LAB
HCT VFR BLD CALC: 33.4 % (ref 40.5–52.5)
HCT VFR BLD CALC: 33.7 % (ref 40.5–52.5)
HCT VFR BLD CALC: 38.5 % (ref 40.5–52.5)
HEMOGLOBIN: 11.4 G/DL (ref 13.5–17.5)
HEMOGLOBIN: 11.7 G/DL (ref 13.5–17.5)
HEMOGLOBIN: 13.1 G/DL (ref 13.5–17.5)
SARS-COV-2: NOT DETECTED

## 2021-11-10 PROCEDURE — 6370000000 HC RX 637 (ALT 250 FOR IP): Performed by: NURSE PRACTITIONER

## 2021-11-10 PROCEDURE — 94760 N-INVAS EAR/PLS OXIMETRY 1: CPT

## 2021-11-10 PROCEDURE — 2580000003 HC RX 258: Performed by: NURSE PRACTITIONER

## 2021-11-10 PROCEDURE — 85014 HEMATOCRIT: CPT

## 2021-11-10 PROCEDURE — 85018 HEMOGLOBIN: CPT

## 2021-11-10 PROCEDURE — 6370000000 HC RX 637 (ALT 250 FOR IP): Performed by: INTERNAL MEDICINE

## 2021-11-10 PROCEDURE — 36415 COLL VENOUS BLD VENIPUNCTURE: CPT

## 2021-11-10 PROCEDURE — 1200000000 HC SEMI PRIVATE

## 2021-11-10 RX ADMIN — POLYETHYLENE GLYCOL-3350 AND ELECTROLYTES 4000 ML: 236; 6.74; 5.86; 2.97; 22.74 POWDER, FOR SOLUTION ORAL at 12:13

## 2021-11-10 RX ADMIN — FINASTERIDE 5 MG: 5 TABLET, FILM COATED ORAL at 09:16

## 2021-11-10 RX ADMIN — CARVEDILOL 12.5 MG: 12.5 TABLET, FILM COATED ORAL at 09:17

## 2021-11-10 RX ADMIN — SODIUM CHLORIDE, PRESERVATIVE FREE 10 ML: 5 INJECTION INTRAVENOUS at 20:32

## 2021-11-10 RX ADMIN — CARVEDILOL 12.5 MG: 12.5 TABLET, FILM COATED ORAL at 17:25

## 2021-11-10 RX ADMIN — SODIUM CHLORIDE, PRESERVATIVE FREE 10 ML: 5 INJECTION INTRAVENOUS at 09:18

## 2021-11-10 ASSESSMENT — PAIN SCALES - GENERAL
PAINLEVEL_OUTOF10: 0
PAINLEVEL_OUTOF10: 0

## 2021-11-10 NOTE — PROGRESS NOTES
Hospitalist Progress Note      PCP: Makenna Miller MD    Date of Admission: 11/8/2021        Subjective:       Doing better. Rectal bleeding is improved. No abdominal pain    Medications:  Reviewed    Infusion Medications    sodium chloride       Scheduled Medications    carvedilol  12.5 mg Oral BID WC    finasteride  5 mg Oral Daily    pravastatin  20 mg Oral Nightly    sodium chloride flush  5-40 mL IntraVENous 2 times per day    polyethylene glycol  4,000 mL Oral Once     PRN Meds: sodium chloride flush, sodium chloride, ondansetron **OR** ondansetron, acetaminophen **OR** acetaminophen      Intake/Output Summary (Last 24 hours) at 11/10/2021 0935  Last data filed at 11/10/2021 0600  Gross per 24 hour   Intake 480 ml   Output 300 ml   Net 180 ml       Physical Exam Performed:    BP (!) 163/76   Pulse 76   Temp 97.7 °F (36.5 °C) (Oral)   Resp 18   Ht 6' (1.829 m)   Wt 146 lb 9.6 oz (66.5 kg)   SpO2 96%   BMI 19.88 kg/m²     General appearance: No apparent distress, appears stated age and cooperative. HEENT: Pupils equal, round, and reactive to light. Conjunctivae/corneas clear. Neck: Supple, with full range of motion. No jugular venous distention. Trachea midline. Respiratory:  Normal respiratory effort. Clear to auscultation, bilaterally without Rales/Wheezes/Rhonchi. Cardiovascular: Regular rate and rhythm with normal S1/S2 without murmurs, rubs or gallops. Abdomen: Soft, non-tender, non-distended with normal bowel sounds. Musculoskeletal: No clubbing, cyanosis or edema bilaterally. Full range of motion without deformity. Skin: Skin color, texture, turgor normal.  No rashes or lesions. Neurologic:  Neurovascularly intact without any focal sensory/motor deficits.  Cranial nerves: II-XII intact, grossly non-focal.  Psychiatric: Alert and oriented, thought content appropriate, normal insight  Capillary Refill: Brisk,3 seconds, normal   Peripheral Pulses: +2 palpable, equal bilaterally       Labs:   Recent Labs     11/08/21 1958 11/08/21 1958 11/09/21  0538 11/09/21  1145 11/09/21  1751 11/09/21  2342 11/10/21  0517   WBC 6.8  --  6.2  --   --   --   --    HGB 13.2*   < > 11.6*   < > 11.5* 11.4* 11.7*   HCT 40.4*   < > 34.3*   < > 35.2* 33.4* 33.7*     --  169  --   --   --   --     < > = values in this interval not displayed. Recent Labs     11/08/21 1958 11/09/21  0538   * 135*   K 4.5 3.5    103   CO2 27 23   BUN 22* 18   CREATININE 0.9 0.7*   CALCIUM 9.1 8.4     Recent Labs     11/08/21 1958   AST 17   ALT 9*   BILITOT 0.5   ALKPHOS 53     Recent Labs     11/09/21  0538   INR 1.02     No results for input(s): [facility] in the last 72 hours. Urinalysis:      Lab Results   Component Value Date    NITRU Negative 10/13/2018    BLOODU Negative 10/13/2018    SPECGRAV 1.018 10/13/2018    GLUCOSEU Negative 10/13/2018       Radiology:  CT ABDOMEN PELVIS W IV CONTRAST Additional Contrast? None   Final Result   1. Diverticulosis without scan evidence for diverticulitis. 2. Cholelithiasis without scan evidence of acute cholecystitis. 3. T12 compression fracture appears to be old. It is not present on chest   radiographs from 03/02/2010. Assessment/Plan:    -Painless rectal bleeding likely diverticular bleed. BRBPR x 1 day. ... , not requiring transfusion. . No episodes since admission . Continue to monitor H&H, . . CT showed evidence of diverticulosis--- GI plans colonoscopy in a.m.    -Mild acute blood loss anemia-H&H dropped from 13.3-11. 6-continue to monitor H&H--no need for transfusion at the moment    -Essential hypertension, uncontrolled--continue Coreg for now but hold active bleeding at present    -Old T12 compression fracture--asymptomatic--incidental finding on CT abdomen--continue to monitor    -Asymptomatic cholelithiasis--incidental finding on CT    -Hyperlipidemia-continue statin    -BPH-continue finasteride      DVT Prophylaxis: scd  Diet: ADULT DIET;  Clear Liquid  Code Status: Full Code        Martina Callejas MD

## 2021-11-10 NOTE — PLAN OF CARE
Problem: Falls - Risk of:  Goal: Will remain free from falls  Description: Will remain free from falls  11/10/2021 0018 by Zahira Riley RN  Outcome: Ongoing  11/9/2021 1111 by Rd Hoyt RN  Outcome: Ongoing  Goal: Absence of physical injury  Description: Absence of physical injury  11/10/2021 0018 by Zahira Riley RN  Outcome: Ongoing  11/9/2021 1111 by Rd Hoyt RN  Outcome: Ongoing     Problem: Skin Integrity:  Goal: Will show no infection signs and symptoms  Description: Will show no infection signs and symptoms  11/10/2021 0018 by Zahira Riley RN  Outcome: Ongoing  11/9/2021 1111 by Rd Hoyt RN  Outcome: Ongoing  Goal: Absence of new skin breakdown  Description: Absence of new skin breakdown  11/10/2021 0018 by Zahira Riley RN  Outcome: Ongoing  11/9/2021 1111 by Rd Hoyt RN  Outcome: Ongoing

## 2021-11-10 NOTE — PROGRESS NOTES
Gastroenterology Note  Patient:   Eber Chand   :    1931   Facility:   [facility]   Date:     11/10/2021  Consultant:   Tiffanie Weber MD, MD      Subjective:     80 y.o. male admitted 2021 with Rectal bleeding [K62.5]  Generalized abdominal pain [R10.84]  Lower GI bleed [K92.2] and seen for lower GI bleeding. .    Present  Diet Order: ADULT DIET; Clear Liquid      Current Medications include:   Scheduled Meds:   carvedilol  12.5 mg Oral BID WC    finasteride  5 mg Oral Daily    pravastatin  20 mg Oral Nightly    sodium chloride flush  5-40 mL IntraVENous 2 times per day    polyethylene glycol  4,000 mL Oral Once     Continuous Infusions:   sodium chloride       PRN Meds:.sodium chloride flush, sodium chloride, ondansetron **OR** ondansetron, acetaminophen **OR** acetaminophen    Allergies:    Allergies   Allergen Reactions    Penicillins        Objective:   Vital Signs:  Temp (24hrs), Av.5 °F (36.4 °C), Min:97.2 °F (36.2 °C), Max:97.7 °F (65.3 °C)     Systolic (95FBP), LLE:692 , Min:153 , BPZ:366      Diastolic (33KBP), YVN:73, Min:74, Max:96     Pulse  Av.8  Min: 66  Max: 76  BP (!) 163/76   Pulse 76   Temp 97.7 °F (36.5 °C) (Oral)   Resp 18   Ht 6' (1.829 m)   Wt 146 lb 9.6 oz (66.5 kg)   SpO2 96%   BMI 19.88 kg/m²      Physical Exam:   General appearance: alert and appears stated age  Lungs: clear to auscultation bilaterally  Heart: regular rate and rhythm, S1, S2 normal, no murmur, click, rub or gallop  Abdomen: soft, non-tender; bowel sounds normal; no masses,  no organomegaly    Lab and Imaging Review   Recent Labs     21  0538 21  1145 21  1751 21  2342 11/10/21  0517   WBC 6.8 6.2  --   --   --   --    HGB 13.2* 11.6* 11.8* 11.5* 11.4* 11.7*   MCV 91.4 90.5  --   --   --   --     169  --   --   --   --    INR  --  1.02  --   --   --   --    * 135*  --   --   --   --    K 4.5 3.5 --   --   --   --     103  --   --   --   --    CO2 27 23  --   --   --   --    BUN 22* 18  --   --   --   --    CREATININE 0.9 0.7*  --   --   --   --    GLUCOSE 201* 90  --   --   --   --    CALCIUM 9.1 8.4  --   --   --   --    PROT 6.9  --   --   --   --   --    LABALBU 4.0  --   --   --   --   --    AST 17  --   --   --   --   --    ALT 9*  --   --   --   --   --    ALKPHOS 53  --   --   --   --   --    BILITOT 0.5  --   --   --   --   --    MG  --  2.00  --   --   --   --        Assessment:     Patient Active Problem List    Diagnosis Date Noted    Rectal bleeding 11/08/2021    Cellulitis of hand     Primary osteoarthritis of first carpometacarpal joint of left hand        Plan:   1. Blood count is stable. Getting prepped for colonoscopy on Thursday.

## 2021-11-11 ENCOUNTER — ANESTHESIA (OUTPATIENT)
Dept: ENDOSCOPY | Age: 86
DRG: 378 | End: 2021-11-11
Payer: MEDICARE

## 2021-11-11 VITALS
DIASTOLIC BLOOD PRESSURE: 82 MMHG | RESPIRATION RATE: 24 BRPM | SYSTOLIC BLOOD PRESSURE: 175 MMHG | OXYGEN SATURATION: 99 %

## 2021-11-11 LAB
ANION GAP SERPL CALCULATED.3IONS-SCNC: 8 MMOL/L (ref 3–16)
BASOPHILS ABSOLUTE: 0 K/UL (ref 0–0.2)
BASOPHILS RELATIVE PERCENT: 0.7 %
BUN BLDV-MCNC: 7 MG/DL (ref 7–20)
CALCIUM SERPL-MCNC: 8.5 MG/DL (ref 8.3–10.6)
CHLORIDE BLD-SCNC: 97 MMOL/L (ref 99–110)
CO2: 27 MMOL/L (ref 21–32)
CREAT SERPL-MCNC: 0.7 MG/DL (ref 0.8–1.3)
EOSINOPHILS ABSOLUTE: 0.3 K/UL (ref 0–0.6)
EOSINOPHILS RELATIVE PERCENT: 5.2 %
GFR AFRICAN AMERICAN: >60
GFR NON-AFRICAN AMERICAN: >60
GLUCOSE BLD-MCNC: 91 MG/DL (ref 70–99)
HCT VFR BLD CALC: 34.3 % (ref 40.5–52.5)
HEMOGLOBIN: 11.7 G/DL (ref 13.5–17.5)
LYMPHOCYTES ABSOLUTE: 0.9 K/UL (ref 1–5.1)
LYMPHOCYTES RELATIVE PERCENT: 16.6 %
MCH RBC QN AUTO: 30.3 PG (ref 26–34)
MCHC RBC AUTO-ENTMCNC: 34.1 G/DL (ref 31–36)
MCV RBC AUTO: 88.8 FL (ref 80–100)
MONOCYTES ABSOLUTE: 0.5 K/UL (ref 0–1.3)
MONOCYTES RELATIVE PERCENT: 9 %
NEUTROPHILS ABSOLUTE: 3.7 K/UL (ref 1.7–7.7)
NEUTROPHILS RELATIVE PERCENT: 68.5 %
PDW BLD-RTO: 13.6 % (ref 12.4–15.4)
PLATELET # BLD: 180 K/UL (ref 135–450)
PMV BLD AUTO: 7.1 FL (ref 5–10.5)
POTASSIUM SERPL-SCNC: 3.4 MMOL/L (ref 3.5–5.1)
RBC # BLD: 3.86 M/UL (ref 4.2–5.9)
SODIUM BLD-SCNC: 132 MMOL/L (ref 136–145)
WBC # BLD: 5.4 K/UL (ref 4–11)

## 2021-11-11 PROCEDURE — 7100000001 HC PACU RECOVERY - ADDTL 15 MIN: Performed by: INTERNAL MEDICINE

## 2021-11-11 PROCEDURE — 7100000000 HC PACU RECOVERY - FIRST 15 MIN: Performed by: INTERNAL MEDICINE

## 2021-11-11 PROCEDURE — 2580000003 HC RX 258: Performed by: NURSE PRACTITIONER

## 2021-11-11 PROCEDURE — 2500000003 HC RX 250 WO HCPCS: Performed by: NURSE ANESTHETIST, CERTIFIED REGISTERED

## 2021-11-11 PROCEDURE — 85025 COMPLETE CBC W/AUTO DIFF WBC: CPT

## 2021-11-11 PROCEDURE — 2580000003 HC RX 258: Performed by: ANESTHESIOLOGY

## 2021-11-11 PROCEDURE — 3700000000 HC ANESTHESIA ATTENDED CARE: Performed by: INTERNAL MEDICINE

## 2021-11-11 PROCEDURE — 1200000000 HC SEMI PRIVATE

## 2021-11-11 PROCEDURE — 3609027000 HC COLONOSCOPY: Performed by: INTERNAL MEDICINE

## 2021-11-11 PROCEDURE — 2709999900 HC NON-CHARGEABLE SUPPLY: Performed by: INTERNAL MEDICINE

## 2021-11-11 PROCEDURE — 3700000001 HC ADD 15 MINUTES (ANESTHESIA): Performed by: INTERNAL MEDICINE

## 2021-11-11 PROCEDURE — 6360000002 HC RX W HCPCS: Performed by: NURSE ANESTHETIST, CERTIFIED REGISTERED

## 2021-11-11 PROCEDURE — 6370000000 HC RX 637 (ALT 250 FOR IP): Performed by: HOSPITALIST

## 2021-11-11 PROCEDURE — 6370000000 HC RX 637 (ALT 250 FOR IP): Performed by: NURSE PRACTITIONER

## 2021-11-11 PROCEDURE — 80048 BASIC METABOLIC PNL TOTAL CA: CPT

## 2021-11-11 PROCEDURE — 36415 COLL VENOUS BLD VENIPUNCTURE: CPT

## 2021-11-11 PROCEDURE — 0DJD8ZZ INSPECTION OF LOWER INTESTINAL TRACT, VIA NATURAL OR ARTIFICIAL OPENING ENDOSCOPIC: ICD-10-PCS | Performed by: INTERNAL MEDICINE

## 2021-11-11 RX ORDER — SODIUM CHLORIDE 0.9 % (FLUSH) 0.9 %
10 SYRINGE (ML) INJECTION EVERY 12 HOURS SCHEDULED
Status: DISCONTINUED | OUTPATIENT
Start: 2021-11-11 | End: 2021-11-12 | Stop reason: SDUPTHER

## 2021-11-11 RX ORDER — LIDOCAINE HYDROCHLORIDE 20 MG/ML
INJECTION, SOLUTION EPIDURAL; INFILTRATION; INTRACAUDAL; PERINEURAL PRN
Status: DISCONTINUED | OUTPATIENT
Start: 2021-11-11 | End: 2021-11-11 | Stop reason: SDUPTHER

## 2021-11-11 RX ORDER — PROPOFOL 10 MG/ML
INJECTION, EMULSION INTRAVENOUS CONTINUOUS PRN
Status: DISCONTINUED | OUTPATIENT
Start: 2021-11-11 | End: 2021-11-11 | Stop reason: SDUPTHER

## 2021-11-11 RX ORDER — LABETALOL HYDROCHLORIDE 5 MG/ML
5 INJECTION, SOLUTION INTRAVENOUS EVERY 10 MIN PRN
Status: DISCONTINUED | OUTPATIENT
Start: 2021-11-11 | End: 2021-11-12 | Stop reason: HOSPADM

## 2021-11-11 RX ORDER — SODIUM CHLORIDE 0.9 % (FLUSH) 0.9 %
10 SYRINGE (ML) INJECTION PRN
Status: DISCONTINUED | OUTPATIENT
Start: 2021-11-11 | End: 2021-11-12 | Stop reason: SDUPTHER

## 2021-11-11 RX ORDER — PROPOFOL 10 MG/ML
INJECTION, EMULSION INTRAVENOUS PRN
Status: DISCONTINUED | OUTPATIENT
Start: 2021-11-11 | End: 2021-11-11 | Stop reason: SDUPTHER

## 2021-11-11 RX ORDER — SODIUM CHLORIDE 9 MG/ML
INJECTION, SOLUTION INTRAVENOUS CONTINUOUS
Status: DISCONTINUED | OUTPATIENT
Start: 2021-11-11 | End: 2021-11-11

## 2021-11-11 RX ORDER — ONDANSETRON 2 MG/ML
4 INJECTION INTRAMUSCULAR; INTRAVENOUS
Status: ACTIVE | OUTPATIENT
Start: 2021-11-11 | End: 2021-11-11

## 2021-11-11 RX ORDER — DIPHENHYDRAMINE HYDROCHLORIDE 50 MG/ML
12.5 INJECTION INTRAMUSCULAR; INTRAVENOUS
Status: ACTIVE | OUTPATIENT
Start: 2021-11-11 | End: 2021-11-11

## 2021-11-11 RX ORDER — PROMETHAZINE HYDROCHLORIDE 25 MG/ML
6.25 INJECTION, SOLUTION INTRAMUSCULAR; INTRAVENOUS
Status: ACTIVE | OUTPATIENT
Start: 2021-11-11 | End: 2021-11-11

## 2021-11-11 RX ORDER — POTASSIUM CHLORIDE 20 MEQ/1
40 TABLET, EXTENDED RELEASE ORAL ONCE
Status: COMPLETED | OUTPATIENT
Start: 2021-11-11 | End: 2021-11-11

## 2021-11-11 RX ORDER — SODIUM CHLORIDE 9 MG/ML
25 INJECTION, SOLUTION INTRAVENOUS PRN
Status: DISCONTINUED | OUTPATIENT
Start: 2021-11-11 | End: 2021-11-12 | Stop reason: SDUPTHER

## 2021-11-11 RX ADMIN — POTASSIUM CHLORIDE 40 MEQ: 20 TABLET, EXTENDED RELEASE ORAL at 18:18

## 2021-11-11 RX ADMIN — LIDOCAINE HYDROCHLORIDE 100 MG: 20 INJECTION, SOLUTION EPIDURAL; INFILTRATION; INTRACAUDAL; PERINEURAL at 16:03

## 2021-11-11 RX ADMIN — PROPOFOL 70 MG: 10 INJECTION, EMULSION INTRAVENOUS at 16:03

## 2021-11-11 RX ADMIN — SODIUM CHLORIDE: 9 INJECTION, SOLUTION INTRAVENOUS at 15:58

## 2021-11-11 RX ADMIN — CARVEDILOL 12.5 MG: 12.5 TABLET, FILM COATED ORAL at 09:03

## 2021-11-11 RX ADMIN — FINASTERIDE 5 MG: 5 TABLET, FILM COATED ORAL at 09:03

## 2021-11-11 RX ADMIN — CARVEDILOL 12.5 MG: 12.5 TABLET, FILM COATED ORAL at 18:18

## 2021-11-11 RX ADMIN — SODIUM CHLORIDE, PRESERVATIVE FREE 10 ML: 5 INJECTION INTRAVENOUS at 19:52

## 2021-11-11 RX ADMIN — PROPOFOL 140 MCG/KG/MIN: 10 INJECTION, EMULSION INTRAVENOUS at 16:03

## 2021-11-11 RX ADMIN — SODIUM CHLORIDE, PRESERVATIVE FREE 10 ML: 5 INJECTION INTRAVENOUS at 09:03

## 2021-11-11 RX ADMIN — PRAVASTATIN SODIUM 20 MG: 20 TABLET ORAL at 19:51

## 2021-11-11 ASSESSMENT — PULMONARY FUNCTION TESTS
PIF_VALUE: 1
PIF_VALUE: 0
PIF_VALUE: 0
PIF_VALUE: 1
PIF_VALUE: 1
PIF_VALUE: 0
PIF_VALUE: 1
PIF_VALUE: 0
PIF_VALUE: 1
PIF_VALUE: 1

## 2021-11-11 ASSESSMENT — PAIN SCALES - GENERAL
PAINLEVEL_OUTOF10: 0

## 2021-11-11 ASSESSMENT — LIFESTYLE VARIABLES: SMOKING_STATUS: 0

## 2021-11-11 ASSESSMENT — PAIN DESCRIPTION - PAIN TYPE: TYPE: ACUTE PAIN

## 2021-11-11 ASSESSMENT — ENCOUNTER SYMPTOMS: SHORTNESS OF BREATH: 0

## 2021-11-11 ASSESSMENT — PAIN - FUNCTIONAL ASSESSMENT: PAIN_FUNCTIONAL_ASSESSMENT: 0-10

## 2021-11-11 NOTE — ANESTHESIA POSTPROCEDURE EVALUATION
Department of Anesthesiology  Postprocedure Note    Patient: Breanna Zuniga  MRN: [id number]  YOB: 1931  Date of evaluation: 11/11/2021  Time:  5:27 PM     Procedure Summary     Date: 11/11/21 Room / Location: [address] [place] [address]    Anesthesia Start: 5010 Anesthesia Stop: 6710    Procedure: COLONOSCOPY (N/A ) Diagnosis:       Rectal bleed      (RECTAL BLEED)    Surgeons: Mervat Skelton MD Responsible Provider: Asia Ramirez MD    Anesthesia Type: MAC ASA Status: 3          Anesthesia Type: MAC    Rebeca Phase I: Rebeca Score: 10    Rebeca Phase II:      Last vitals: Reviewed and per EMR flowsheets.        Anesthesia Post Evaluation    Patient location during evaluation: bedside  Patient participation: complete - patient participated  Level of consciousness: awake and alert  Pain score: 0  Nausea & Vomiting: no nausea  Complications: no  Cardiovascular status: hemodynamically stable  Respiratory status: acceptable  Hydration status: stable

## 2021-11-11 NOTE — ANESTHESIA PRE PROCEDURE
Department of Anesthesiology  Preprocedure Note       Name:  Angelita Ledbetter   Age:  80 y.o.  :  1931                                          MRN:  [id number]         Date:  2021      Surgeon: Yaritza Garcia):  Alfonso Arceo MD    Procedure: Procedure(s):  COLONOSCOPY    Medications prior to admission:   Prior to Admission medications    Medication Sig Start Date End Date Taking?  Authorizing Provider   fluticasone (FLONASE) 50 MCG/ACT nasal spray 2 sprays by Each Nostril route daily as needed 21  Yes Historical Provider, MD   carvedilol (COREG) 12.5 MG tablet Take 12.5 mg by mouth 2 times daily (with meals)  19  Yes Historical Provider, MD   finasteride (PROSCAR) 5 MG tablet Take 5 mg by mouth daily  18  Yes Historical Provider, MD   pravastatin (PRAVACHOL) 20 MG tablet Take 20 mg by mouth daily  18  Yes Historical Provider, MD       Current medications:    Current Facility-Administered Medications   Medication Dose Route Frequency Provider Last Rate Last Admin    0.9 % sodium chloride infusion   IntraVENous Continuous Charu Antunez MD        sodium chloride flush 0.9 % injection 10 mL  10 mL IntraVENous 2 times per day Charu Antunez MD        sodium chloride flush 0.9 % injection 10 mL  10 mL IntraVENous PRN Charu Antunez MD        0.9 % sodium chloride infusion  25 mL IntraVENous PRN Charu Antunez MD        potassium chloride (KLOR-CON M) extended release tablet 40 mEq  40 mEq Oral Once [place] MD        carvedilol (COREG) tablet 12.5 mg  12.5 mg Oral BID WC Angeline Other, APRN - CNP   12.5 mg at 21 2890    finasteride (PROSCAR) tablet 5 mg  5 mg Oral Daily Angeline Other, APRN - CNP   5 mg at 21 8459    pravastatin (PRAVACHOL) tablet 20 mg  20 mg Oral Nightly Angeline Other, APRN - CNP   20 mg at 216    sodium chloride flush 0.9 % injection 5-40 mL  5-40 mL IntraVENous 2 times per day Angeline Other, APRN - CNP   10 mL at 21 3466  sodium chloride flush 0.9 % injection 5-40 mL  5-40 mL IntraVENous PRN Leverne Grapes, APRN - CNP        0.9 % sodium chloride infusion  25 mL IntraVENous PRN Leverne Grapes, APRN - CNP        ondansetron (ZOFRAN-ODT) disintegrating tablet 4 mg  4 mg Oral Q8H PRN Leverne Grapes, APRN - CNP        Or    ondansetron (ZOFRAN) injection 4 mg  4 mg IntraVENous Q6H PRN Leverne Grapes, APRN - CNP        acetaminophen (TYLENOL) tablet 650 mg  650 mg Oral Q6H PRN Leverne Grapes, APRN - CNP        Or    acetaminophen (TYLENOL) suppository 650 mg  650 mg Rectal Q6H PRN Leverne Grapes, APRN - CNP           Allergies: Allergies   Allergen Reactions    Penicillins        Problem List:    Patient Active Problem List   Diagnosis Code    Cellulitis of hand C94.615    Primary osteoarthritis of first carpometacarpal joint of left hand M18.12    Rectal bleeding K62.5       Past Medical History:        Diagnosis Date    Arthritis     Hyperlipidemia     Hypertension     Prostate troubles        Past Surgical History:        Procedure Laterality Date    APPENDECTOMY      COLONOSCOPY N/A 3/5/2019    [facility] [address] [facility] [address]         Social History:    Social History     Tobacco Use    Smoking status: Former Smoker    Smokeless tobacco: Never Used    Tobacco comment: smoked for 10 years    Substance Use Topics    Alcohol use:  No                                Counseling given: Not Answered  Comment: smoked for 10 years       Vital Signs (Current):   Vitals:    11/11/21 0458 11/11/21 0630 11/11/21 1425 11/11/21 1443   BP: (!) 150/86  (!) 165/84 (!) 166/76   Pulse: 71  76 74   Resp: 17  18 16   Temp: 97.4 °F (36.3 °C)  97.6 °F (36.4 °C) 97.9 °F (36.6 °C)   TempSrc: Oral  Oral Temporal   SpO2: 97%  95% 98%   Weight:  146 lb 2.6 oz (66.3 kg)     Height:                                                  BP Readings from Last 3 Encounters: 11/11/21 (!) 166/76   03/05/19 118/64   03/05/19 132/77       NPO Status: Time of last liquid consumption: 2359                        Time of last solid consumption: 2200                        Date of last liquid consumption: 11/10/21                        Date of last solid food consumption: 11/10/21    BMI:   Wt Readings from Last 3 Encounters:   11/11/21 146 lb 2.6 oz (66.3 kg)   03/05/19 150 lb 12.8 oz (68.4 kg)   10/13/18 160 lb 9.6 oz (72.8 kg)     Body mass index is 19.82 kg/m². CBC:   Lab Results   Component Value Date    WBC 5.4 11/11/2021    RBC 3.86 11/11/2021    HGB 11.7 11/11/2021    HCT 34.3 11/11/2021    MCV 88.8 11/11/2021    RDW 13.6 11/11/2021     11/11/2021       CMP:   Lab Results   Component Value Date     11/11/2021    K 3.4 11/11/2021    K 3.5 11/09/2021    CL 97 11/11/2021    CO2 27 11/11/2021    BUN 7 11/11/2021    CREATININE 0.7 11/11/2021    GFRAA >60 11/11/2021    AGRATIO 1.4 11/08/2021    LABGLOM >60 11/11/2021    GLUCOSE 91 11/11/2021    PROT 6.9 11/08/2021    CALCIUM 8.5 11/11/2021    BILITOT 0.5 11/08/2021    ALKPHOS 53 11/08/2021    AST 17 11/08/2021    ALT 9 11/08/2021       POC Tests: No results for input(s): POCGLU, POCNA, POCK, POCCL, POCBUN, POCHEMO, POCHCT in the last 72 hours.     Coags:   Lab Results   Component Value Date    PROTIME 11.5 11/09/2021    INR 1.02 11/09/2021       HCG (If Applicable): No results found for: PREGTESTUR, PREGSERUM, HCG, HCGQUANT     ABGs: No results found for: PHART, PO2ART, BJX4WFS, YJG3YOF, BEART, P4KXFXLU     Type & Screen (If Applicable):  No results found for: LABABO, LABRH    Drug/Infectious Status (If Applicable):  No results found for: HIV, HEPCAB    COVID-19 Screening (If Applicable):   Lab Results   Component Value Date    COVID19 Not Detected 11/09/2021           Anesthesia Evaluation  Patient summary reviewed no history of anesthetic complications:   Airway: Mallampati: II  TM distance: >3 FB   Neck ROM: full  Mouth opening: > = 3 FB Dental: normal exam         Pulmonary:Negative Pulmonary ROS       (-) shortness of breath and not a current smoker          Patient did not smoke on day of surgery. Cardiovascular:Negative CV ROS    (+) hypertension:, hyperlipidemia    (-) pacemaker, past MI, CABG/stent and  angina       Beta Blocker:  Not on Beta Blocker         Neuro/Psych:   Negative Neuro/Psych ROS     (-) seizures and CVA           GI/Hepatic/Renal:        (-) liver disease and no renal disease      ROS comment: GI bleed. Endo/Other: Negative Endo/Other ROS       (-) diabetes mellitus, hypothyroidism, hyperthyroidism               Abdominal:             Vascular: negative vascular ROS. Other Findings:             Anesthesia Plan      MAC     ASA 3     (Hgb 11.7    This pre-anesthesia assessment may be used as a history and physical.    DOS STAFF ADDENDUM:    Pt seen and examined, chart reviewed (including anesthesia, drug and allergy history). No interval changes to history and physical examination. Anesthetic plan, risks, benefits, alternatives, and personnel involved discussed with patient. Patient verbalized an understanding and agrees to proceed. Yuri Skelton MD  November 11, 2021  3:13 PM    )  Induction: intravenous. Anesthetic plan and risks discussed with patient. Plan discussed with CRNA.                   Yuri Skelton MD   11/11/2021

## 2021-11-11 NOTE — PLAN OF CARE
Problem: Falls - Risk of:  Goal: Will remain free from falls  Description: Will remain free from falls  11/10/2021 2323 by Ashish Livingston RN  Outcome: Ongoing  11/10/2021 1530 by Malcom Orta RN  Outcome: Ongoing     Problem: Falls - Risk of:  Goal: Absence of physical injury  Description: Absence of physical injury  11/10/2021 2323 by Ashish Livingston RN  Outcome: Ongoing  11/10/2021 1530 by Malcom Orta RN  Outcome: Ongoing    Patient uses call light appropriately to express needs. Bed to lowest position with door open and call light in reach. All fall precautions implemented at this time. Siderails up x2. Non skid footwear in place. Seen at intervals to ensure safety. All needs attended.      Problem: Skin Integrity:  Goal: Will show no infection signs and symptoms  Description: Will show no infection signs and symptoms  11/10/2021 2323 by Ashish Livingston RN  Outcome: Ongoing  11/10/2021 1530 by Malcom Orta RN  Outcome: Ongoing     Problem: Skin Integrity:  Goal: Absence of new skin breakdown  Description: Absence of new skin breakdown  11/10/2021 2323 by Ashish Livingston RN  Outcome: Ongoing  11/10/2021 1530 by Malcom Orta RN  Outcome: Ongoing

## 2021-11-11 NOTE — H&P
[facility]   Pre-operative History and Physical    Patient: Aparna Hunter  : 1931  Acct#: [de-identified]    History Obtained From: electronic medical record    HISTORY OF PRESENT ILLNESS  Procedure:Colonoscopy  Indications:rectal bleeding  Past Medical History:        Diagnosis Date    Arthritis     Hyperlipidemia     Hypertension     Prostate troubles      Past Surgical History:        Procedure Laterality Date    APPENDECTOMY      COLONOSCOPY N/A 3/5/2019    COLONOSCOPY DIAGNOSTIC performed by Crow Portillo MD at [address] ([place] [address])       Medications prior to admission:   Prior to Admission medications    Medication Sig Start Date End Date Taking?  Authorizing Provider   fluticasone (FLONASE) 50 MCG/ACT nasal spray 2 sprays by Each Nostril route daily as needed 21  Yes Historical Provider, MD   carvedilol (COREG) 12.5 MG tablet Take 12.5 mg by mouth 2 times daily (with meals)  19  Yes Historical Provider, MD   finasteride (PROSCAR) 5 MG tablet Take 5 mg by mouth daily  18  Yes Historical Provider, MD   pravastatin (PRAVACHOL) 20 MG tablet Take 20 mg by mouth daily  18  Yes Historical Provider, MD     Allergies:   Penicillins    Social History     Socioeconomic History    Marital status:      Spouse name: Not on file    Number of children: Not on file    Years of education: Not on file    Highest education level: Not on file   Occupational History    Not on file   Tobacco Use    Smoking status: Former Smoker    Smokeless tobacco: Never Used    Tobacco comment: smoked for 10 years    Substance and Sexual Activity    Alcohol use: No    Drug use: No    Sexual activity: Not on file   Other Topics Concern    Not on file   Social History Narrative    Not on file     Social Determinants of Health     Financial Resource Strain:     Difficulty of Paying Living Expenses: Not on file   Food Insecurity:     Worried About [address] in the Last Year: Not on file    Ran Out of Food in the Last Year: Not on file   Transportation Needs:     Lack of Transportation (Medical): Not on file    Lack of Transportation (Non-Medical): Not on file   Physical Activity:     Days of Exercise per Week: Not on file    Minutes of Exercise per Session: Not on file   Stress:     Feeling of Stress : Not on file   Social Connections:     Frequency of Communication with Friends and Family: Not on file    Frequency of Social Gatherings with Friends and Family: Not on file    Attends Jew Services: Not on file    Active Member of [address] [place] [address] [employer] or Organizations: Not on file    Attends Club or Organization Meetings: Not on file    Marital Status: Not on file   Intimate Partner Violence:     Fear of Current or Ex-Partner: Not on file    Emotionally Abused: Not on file    Physically Abused: Not on file    Sexually Abused: Not on file   Housing Stability:     Unable to Pay for Housing in the Last Year: Not on file    Number of [place] in the Last Year: Not on file    Unstable Housing in the Last Year: Not on file     History reviewed. No pertinent family history. PHYSICAL EXAM:      BP (!) 166/76   Pulse 74   Temp 97.9 °F (36.6 °C) (Temporal)   Resp 16   Ht 6' (1.829 m)   Wt 146 lb 2.6 oz (66.3 kg)   SpO2 98%   BMI 19.82 kg/m²  I        Heart:normal    Lungs: normal    Abdomen: normal      ASA Grade:  See anesthesia note      ASSESSMENT AND PLAN:    1. Procedure options, risks and benefits reviewed with patient and expresses understanding.

## 2021-11-11 NOTE — PLAN OF CARE
Problem: Falls - Risk of:  Goal: Will remain free from falls  Description: Will remain free from falls  11/11/2021 1142 by Osbaldo Alfred RN  Outcome: Ongoing  11/10/2021 2323 by Tonja Jean Baptiste RN  Outcome: Ongoing  Goal: Absence of physical injury  Description: Absence of physical injury  11/11/2021 1142 by Osbaldo Alfred RN  Outcome: Ongoing  11/10/2021 2323 by Tonja Jean Baptiste RN  Outcome: Ongoing     Problem: Skin Integrity:  Goal: Will show no infection signs and symptoms  Description: Will show no infection signs and symptoms  11/11/2021 1142 by Osbaldo Alfred RN  Outcome: Ongoing  11/10/2021 2323 by Tonja Jean Baptiste RN  Outcome: Ongoing  Goal: Absence of new skin breakdown  Description: Absence of new skin breakdown  11/11/2021 1142 by sObaldo Alfred RN  Outcome: Ongoing  11/10/2021 2323 by Tonja Jean Baptiste RN  Outcome: Ongoing

## 2021-11-11 NOTE — CARE COORDINATION
Met with patient to discuss discharge plan. IMM given. Patient reports his daughter and spouse will be here to transport home if dc today. Denies further needs.      Bree TAVERAS, LISW-S, Social Work  [phone]    Electronically signed by DARCY Joyce, YAW on 11/11/2021 at 11:22 AM

## 2021-11-11 NOTE — PROGRESS NOTES
bilaterally       Labs:   Recent Labs     11/08/21 1958 11/08/21 1958 11/09/21 0538 11/09/21  1145 11/10/21  0517 11/10/21  1147 11/11/21  0516   WBC 6.8  --  6.2  --   --   --  5.4   HGB 13.2*   < > 11.6*   < > 11.7* 13.1* 11.7*   HCT 40.4*   < > 34.3*   < > 33.7* 38.5* 34.3*     --  169  --   --   --  180    < > = values in this interval not displayed. Recent Labs     11/08/21 1958 11/09/21 0538 11/11/21 0516   * 135* 132*   K 4.5 3.5 3.4*    103 97*   CO2 27 23 27   BUN 22* 18 7   CREATININE 0.9 0.7* 0.7*   CALCIUM 9.1 8.4 8.5     Recent Labs     11/08/21 1958   AST 17   ALT 9*   BILITOT 0.5   ALKPHOS 53     Recent Labs     11/09/21 0538   INR 1.02     No results for input(s): Cherre Gash in the last 72 hours. Urinalysis:      Lab Results   Component Value Date    NITRU Negative 10/13/2018    BLOODU Negative 10/13/2018    SPECGRAV 1.018 10/13/2018    GLUCOSEU Negative 10/13/2018       Radiology:  CT ABDOMEN PELVIS W IV CONTRAST Additional Contrast? None   Final Result   1. Diverticulosis without scan evidence for diverticulitis. 2. Cholelithiasis without scan evidence of acute cholecystitis. 3. T12 compression fracture appears to be old. It is not present on chest   radiographs from 03/02/2010. Assessment/Plan:    -Painless rectal bleeding likely diverticular bleed. BRBPR x 1 day. ... , not requiring transfusion. . No episodes since admission . Continue to monitor H&H, . . CT showed evidence of diverticulosis--- G colonoscopy scheduled for this p.m.    -Mild acute blood loss anemia-H&H dropped from 13.3-11.6 but has remained stable-continue to monitor H&H--no need for transfusion at the moment    -Essential hypertension, uncontrolled--continue Coreg for now but hold active bleeding at present    -Old T12 compression fracture--asymptomatic--incidental finding on CT abdomen--continue to monitor    -Asymptomatic cholelithiasis--incidental finding on CT    -Hyperlipidemia-continue statin    -BPH-continue finasteride    -Hypokalemia--replete potassium    DVT Prophylaxis: scd  Diet: Diet NPO  Code Status: Full Code    Discharge home once cleared by GI.  Scheduled for colonoscopy later this evening      Rochelle White MD

## 2021-11-11 NOTE — DISCHARGE SUMMARY
Hospital Discharge Summary    Patient's PCP: Ramon Tanner MD  Admit Date: 11/8/2021   Discharge Date: 11/12/2021    Admitting Physician: Dr. Chin Al MD  Discharge Physician: Dr. Zhang Stein MD   Consults: GI    Brief HPI:       80 y.o. male with PMHx of HLD, HTN and enlarged prostate presented to [facility] with bloody stool. Patient reports 5-6 bloody stools for 1 day    Brief hospital course:     -Acute diverticular bleed. BRBPR x 1 day. ... ,. CT showed evidence of diverticulosis--- G colonoscopy 11/11 showed diffuse diverticulosis with no evidence of bleeding--bleeding stopped spontaneously within 24 hours     -Mild acute blood loss anemia-H&H dropped from 13.3-11.6 but has remained stable--no need for transfusion     -Essential hypertension, uncontrolled--continue Coreg      -Old T12 compression fracture--asymptomatic--incidental finding on CT abdomen--continue to monitor     -Asymptomatic cholelithiasis--incidental finding on CT     -Hyperlipidemia-continue statin     -BPH-continue finasteride     -Hypokalemia--repleted     Invasive procedures:  c-scope 11/11-diverticulosis, no active bleeding    Discharge Diagnoses: Active Problems:    Rectal bleeding  Resolved Problems:    * No resolved hospital problems. *      Physical Exam: /85   Pulse 74   Temp 98.2 °F (36.8 °C) (Oral)   Resp 17   Ht 6' (1.829 m)   Wt 146 lb 2.6 oz (66.3 kg)   SpO2 94%   BMI 19.82 kg/m²   Gen/overall appearance: Not in acute distress. Alert. Head: Normocephalic, atraumatic  Eyes: EOMI, good acuity  ENT:- Oral mucosa moist  Neck: No JVD, thyromegaly  CVS: Nml S1S2, no MRG, RRR  Pulm: Clear bilaterally. No crackles/wheezes  Gastrointestinal: Soft, NT/ND, +BS  Musculoskeletal: No edema. Warm  Neuro: No focal deficit. Moves extremity spontaneously. Psychiatry: Appropriate affect. Not agitated. Skin: Warm, dry with normal turgor.  No rash        Significant Diagnostic Studies:    See above      Treatments: As above. Discharge Medications:     Medication List      CONTINUE taking these medications    carvedilol 12.5 MG tablet  Commonly known as: COREG     finasteride 5 MG tablet  Commonly known as: PROSCAR     fluticasone 50 MCG/ACT nasal spray  Commonly known as: FLONASE     pravastatin 20 MG tablet  Commonly known as: PRAVACHOL            Activity: activity as tolerated  Diet: ADULT DIET; Regular      Disposition: home  Discharged Condition: Stable  Follow Up:   Juancarlos Scherer MD  [facility]. [address] [place] [id number]  [phone]    Schedule an appointment as soon as possible for a visit in 2 weeks          Code status:  Full Code         Total time spent on discharge, finalizing medications, referrals and arranging outpatient follow up was more than 45 minutes      Thank you Dr. Chacorta Hansen MD for the opportunity to be involved in this patients care.

## 2021-11-12 VITALS
HEIGHT: 72 IN | DIASTOLIC BLOOD PRESSURE: 85 MMHG | BODY MASS INDEX: 19.8 KG/M2 | HEART RATE: 74 BPM | TEMPERATURE: 98.2 F | SYSTOLIC BLOOD PRESSURE: 136 MMHG | RESPIRATION RATE: 17 BRPM | OXYGEN SATURATION: 94 % | WEIGHT: 146.16 LBS

## 2021-11-12 PROCEDURE — 2580000003 HC RX 258: Performed by: ANESTHESIOLOGY

## 2021-11-12 PROCEDURE — 6370000000 HC RX 637 (ALT 250 FOR IP): Performed by: NURSE PRACTITIONER

## 2021-11-12 PROCEDURE — 2580000003 HC RX 258: Performed by: NURSE PRACTITIONER

## 2021-11-12 RX ADMIN — SODIUM CHLORIDE, PRESERVATIVE FREE 10 ML: 5 INJECTION INTRAVENOUS at 07:56

## 2021-11-12 RX ADMIN — SODIUM CHLORIDE, PRESERVATIVE FREE 10 ML: 5 INJECTION INTRAVENOUS at 09:59

## 2021-11-12 RX ADMIN — FINASTERIDE 5 MG: 5 TABLET, FILM COATED ORAL at 07:55

## 2021-11-12 RX ADMIN — CARVEDILOL 12.5 MG: 12.5 TABLET, FILM COATED ORAL at 07:55

## 2021-11-12 ASSESSMENT — PAIN SCALES - GENERAL
PAINLEVEL_OUTOF10: 0

## 2021-11-12 NOTE — PLAN OF CARE
Problem: Falls - Risk of:  Goal: Will remain free from falls  Description: Will remain free from falls  11/11/2021 2334 by Gildardo Garza RN  Outcome: Ongoing  11/11/2021 1142 by Farnaz Zaman RN  Outcome: Ongoing     Problem: Falls - Risk of:  Goal: Absence of physical injury  Description: Absence of physical injury  11/11/2021 2334 by Gildardo Garza RN  Outcome: Ongoing  11/11/2021 1142 by Farnaz Zaman RN  Outcome: Ongoing    Patient uses call light appropriately to express needs. Bed to lowest position with door open and call light in reach. All fall precautions implemented at this time. Siderails up x2. Non skid footwear in place. Seen at intervals to ensure safety. All needs attended.       Problem: Skin Integrity:  Goal: Will show no infection signs and symptoms  Description: Will show no infection signs and symptoms  11/11/2021 2334 by Gildardo Garza RN  Outcome: Ongoing  11/11/2021 1142 by Farnaz Zaman RN  Outcome: Ongoing     Problem: Skin Integrity:  Goal: Absence of new skin breakdown  Description: Absence of new skin breakdown  11/11/2021 2334 by Gildardo Garza RN  Outcome: Ongoing  11/11/2021 1142 by Farnaz Zaman RN  Outcome: Ongoing

## 2021-11-12 NOTE — FLOWSHEET NOTE
Pt IV removed per MD order for Discharge.  SN Joseph supervised by [facility]

## 2021-11-12 NOTE — CARE COORDINATION
DISCHARGE SUMMARY     DATE OF DISCHARGE: 11/12/2021    DISCHARGE DESTINATION: Home with family. FACILITY: None    TRANSPORTATION: Family will transport. Company Name:  [employer]  Relationship: Daughter  Paco Saul up Time: 12:30-1pm  Phone Number: [phone]    COMMENTS: SW met with patient at bedside today regarding discharge needs. He denies he has any and stated that his daughter and wife will arrive together to pick him up about 12:30pm. No further needs noted unless indicated by patient, family and/or medical staff. Respectfully submitted,    NICKIE Flores  [facility]   [phone]  Electronically signed by NICKIE Ness on 11/12/2021 at 11:51 AM

## 2021-11-12 NOTE — CARE COORDINATION
A quick chart review reveals that patient had a successful colonoscopy yesterday. We are waiting on GI clearance and advanced diet toleration. SW will follow up with patient once discharge order is placed. Respectfully submitted,    NICKIE Bull  [facility]   [phone]    Electronically signed by NICKIE Guadarrama on 11/12/2021 at 9:37 AM

## 2021-11-12 NOTE — PLAN OF CARE
Problem: Falls - Risk of:  Goal: Will remain free from falls  Description: Will remain free from falls  11/12/2021 0857 by Andrey Hidalgo RN  Outcome: Ongoing  11/11/2021 2334 by Mauri Preston RN  Outcome: Ongoing  Goal: Absence of physical injury  Description: Absence of physical injury  11/12/2021 0857 by Andrey Hidalgo RN  Outcome: Ongoing  11/11/2021 2334 by Mauri Preston RN  Outcome: Ongoing     Problem: Skin Integrity:  Goal: Will show no infection signs and symptoms  Description: Will show no infection signs and symptoms  11/12/2021 0857 by Andrey Hidalgo RN  Outcome: Ongoing  11/11/2021 2334 by Mauri Preston RN  Outcome: Ongoing  Goal: Absence of new skin breakdown  Description: Absence of new skin breakdown  11/12/2021 0857 by Andrey Hidalgo RN  Outcome: Ongoing  11/11/2021 2334 by Mauri Preston RN  Outcome: Ongoing

## (undated) DEVICE — ENDOSCOPY KIT: Brand: MEDLINE INDUSTRIES, INC.